# Patient Record
Sex: MALE | Race: WHITE | HISPANIC OR LATINO
[De-identification: names, ages, dates, MRNs, and addresses within clinical notes are randomized per-mention and may not be internally consistent; named-entity substitution may affect disease eponyms.]

---

## 2017-08-02 PROBLEM — Z00.00 ENCOUNTER FOR PREVENTIVE HEALTH EXAMINATION: Status: ACTIVE | Noted: 2017-08-02

## 2017-08-30 ENCOUNTER — APPOINTMENT (OUTPATIENT)
Dept: CARDIOLOGY | Facility: CLINIC | Age: 52
End: 2017-08-30
Payer: COMMERCIAL

## 2017-08-30 PROCEDURE — 99245 OFF/OP CONSLTJ NEW/EST HI 55: CPT

## 2017-08-30 PROCEDURE — 93000 ELECTROCARDIOGRAM COMPLETE: CPT

## 2017-09-18 ENCOUNTER — TRANSCRIPTION ENCOUNTER (OUTPATIENT)
Age: 52
End: 2017-09-18

## 2017-10-11 ENCOUNTER — APPOINTMENT (OUTPATIENT)
Dept: CARDIOLOGY | Facility: CLINIC | Age: 52
End: 2017-10-11
Payer: COMMERCIAL

## 2017-10-11 PROCEDURE — 93306 TTE W/DOPPLER COMPLETE: CPT

## 2017-10-11 PROCEDURE — 78452 HT MUSCLE IMAGE SPECT MULT: CPT

## 2017-10-11 PROCEDURE — 93015 CV STRESS TEST SUPVJ I&R: CPT

## 2017-10-11 PROCEDURE — 93979 VASCULAR STUDY: CPT

## 2017-10-11 PROCEDURE — 93880 EXTRACRANIAL BILAT STUDY: CPT

## 2017-10-11 PROCEDURE — A9502: CPT

## 2017-10-13 ENCOUNTER — RECORD ABSTRACTING (OUTPATIENT)
Age: 52
End: 2017-10-13

## 2017-10-13 DIAGNOSIS — Z86.39 PERSONAL HISTORY OF OTHER ENDOCRINE, NUTRITIONAL AND METABOLIC DISEASE: ICD-10-CM

## 2017-10-13 DIAGNOSIS — Z87.891 PERSONAL HISTORY OF NICOTINE DEPENDENCE: ICD-10-CM

## 2017-10-13 DIAGNOSIS — Z83.3 FAMILY HISTORY OF DIABETES MELLITUS: ICD-10-CM

## 2017-10-13 DIAGNOSIS — Z78.9 OTHER SPECIFIED HEALTH STATUS: ICD-10-CM

## 2017-10-13 DIAGNOSIS — Z82.49 FAMILY HISTORY OF ISCHEMIC HEART DISEASE AND OTHER DISEASES OF THE CIRCULATORY SYSTEM: ICD-10-CM

## 2017-12-01 ENCOUNTER — APPOINTMENT (OUTPATIENT)
Dept: CARDIOLOGY | Facility: CLINIC | Age: 52
End: 2017-12-01
Payer: COMMERCIAL

## 2017-12-01 VITALS
WEIGHT: 240 LBS | HEIGHT: 70 IN | HEART RATE: 79 BPM | DIASTOLIC BLOOD PRESSURE: 108 MMHG | OXYGEN SATURATION: 96 % | BODY MASS INDEX: 34.36 KG/M2 | SYSTOLIC BLOOD PRESSURE: 150 MMHG

## 2017-12-01 PROCEDURE — 99214 OFFICE O/P EST MOD 30 MIN: CPT

## 2017-12-04 ENCOUNTER — APPOINTMENT (OUTPATIENT)
Dept: CARDIOLOGY | Facility: CLINIC | Age: 52
End: 2017-12-04

## 2018-04-30 ENCOUNTER — MEDICATION RENEWAL (OUTPATIENT)
Age: 53
End: 2018-04-30

## 2018-09-06 ENCOUNTER — TRANSCRIPTION ENCOUNTER (OUTPATIENT)
Age: 53
End: 2018-09-06

## 2018-09-21 ENCOUNTER — APPOINTMENT (OUTPATIENT)
Dept: CARDIOLOGY | Facility: CLINIC | Age: 53
End: 2018-09-21
Payer: COMMERCIAL

## 2018-09-21 ENCOUNTER — NON-APPOINTMENT (OUTPATIENT)
Age: 53
End: 2018-09-21

## 2018-09-21 VITALS
DIASTOLIC BLOOD PRESSURE: 108 MMHG | BODY MASS INDEX: 34.36 KG/M2 | OXYGEN SATURATION: 95 % | SYSTOLIC BLOOD PRESSURE: 150 MMHG | HEART RATE: 83 BPM | WEIGHT: 240 LBS | HEIGHT: 70 IN

## 2018-09-21 PROCEDURE — 93000 ELECTROCARDIOGRAM COMPLETE: CPT

## 2018-09-21 PROCEDURE — 99214 OFFICE O/P EST MOD 30 MIN: CPT

## 2019-09-18 ENCOUNTER — APPOINTMENT (OUTPATIENT)
Dept: CARDIOLOGY | Facility: CLINIC | Age: 54
End: 2019-09-18
Payer: COMMERCIAL

## 2019-09-18 ENCOUNTER — NON-APPOINTMENT (OUTPATIENT)
Age: 54
End: 2019-09-18

## 2019-09-18 VITALS
WEIGHT: 240 LBS | HEIGHT: 70 IN | SYSTOLIC BLOOD PRESSURE: 130 MMHG | BODY MASS INDEX: 34.36 KG/M2 | HEART RATE: 85 BPM | DIASTOLIC BLOOD PRESSURE: 100 MMHG | OXYGEN SATURATION: 97 %

## 2019-09-18 PROCEDURE — 93000 ELECTROCARDIOGRAM COMPLETE: CPT

## 2019-09-18 PROCEDURE — 99214 OFFICE O/P EST MOD 30 MIN: CPT

## 2019-09-18 NOTE — PHYSICAL EXAM
[General Appearance - Well Developed] : well developed [Well Groomed] : well groomed [Normal Appearance] : normal appearance [No Deformities] : no deformities [General Appearance - Well Nourished] : well nourished [General Appearance - In No Acute Distress] : no acute distress [Normal Oral Mucosa] : normal oral mucosa [No Oral Pallor] : no oral pallor [No Oral Cyanosis] : no oral cyanosis [Normal Jugular Venous A Waves Present] : normal jugular venous A waves present [Normal Jugular Venous V Waves Present] : normal jugular venous V waves present [No Jugular Venous Robles A Waves] : no jugular venous robles A waves [Auscultation Breath Sounds / Voice Sounds] : lungs were clear to auscultation bilaterally [Exaggerated Use Of Accessory Muscles For Inspiration] : no accessory muscle use [Respiration, Rhythm And Depth] : normal respiratory rhythm and effort [Heart Rate And Rhythm] : heart rate and rhythm were normal [Heart Sounds] : normal S1 and S2 [Abdomen Soft] : soft [Murmurs] : no murmurs present [Abdomen Tenderness] : non-tender [Abnormal Walk] : normal gait [Abdomen Mass (___ Cm)] : no abdominal mass palpated [Nail Clubbing] : no clubbing of the fingernails [Gait - Sufficient For Exercise Testing] : the gait was sufficient for exercise testing [Cyanosis, Localized] : no localized cyanosis [Petechial Hemorrhages (___cm)] : no petechial hemorrhages [Oriented To Time, Place, And Person] : oriented to person, place, and time [] : no ischemic changes [Affect] : the affect was normal [Mood] : the mood was normal [No Anxiety] : not feeling anxious [FreeTextEntry1] : Pleasant muscular middle-aged male

## 2019-09-18 NOTE — DISCUSSION/SUMMARY
[FreeTextEntry1] : Poncho is a 53-year-old male  with medical history detailed above and active medical issues including:\par \par -History of atypical chest pain, borderline abnormal baseline EKG unchanged,  normal perfusion MPI stress test, normal LVEF Nov 2017. Patient will have noninvasive testing with exercise stress echo to assess for obstructive CAD, HR and BP response, exercise-induced arrhythmia, 2DEcho for LVEF, structural heart disease, carotid and abdominal ultrasound to assess for obstructive PAD.\par \par -Dyslipidemia. Patient wishes to establish a low-fat low-cholesterol diet,  repeat labs ordered. \par \par -  Labile hypertension and associated HA. Average home blood pressures 140/100.  Home cuff will be calibrated next office visit.  Patient has hypertension and associated headache.  Started on Cozaar 25 mg daily followup home BPs. .  Discussed low salt diet, reduced caffeine and continued moderate exercise\par \par -Multiple orthopedic surgeries. \par \par - [employer] and [employer]\par \par Patient will be seen in cardiology followup after noninvasive testing. \par \par Poncho will follow up with Morgan Piña for primary care. \par

## 2019-09-18 NOTE — REASON FOR VISIT
[Follow-Up - Clinic] : a clinic follow-up of [FreeTextEntry2] : headache, dyslipidemia, labile HTN [FreeTextEntry1] : Poncho is a 53-year-old male with history of borderline dyslipidemia, multiple orthopedic surgeries,  for [employer]. \par \par Average home blood pressures 140/100.  Home cuff will be calibrated next office visit.  Patient has hypertension and associated headache.  Started on Cozaar 25 mg daily followup home BPs. \par \par Cardiovascular review of symptoms is negative for exertional chest pain, dyspnea, palpitations, dizziness or syncope.  No PND or orthopnea leg edema.  No bleeding or black stool.\par \par Patient is exercising 30 minutes in the gym without exertional chest pain.  \par \par Labs June 2018, normal CBC, BMP, LFTs, TSH, HbA1c 5.0,  potassium 5.4, total cholesterol 243, triglyceride 129, HDL 49, \par \par Exercise Myoview stress test October 2017 LVEF 58%, normal perfusion, nonischemic EKG response, no anginal symptoms, baseline NSR early repolarization,  90% MPHR, 10 minutes Taras protocol.\par \par 2-D echo October 2017, LVEF 60%, normal diastolic function, trace MR TR, normal PASP the\par \par Carotid and abdominal ultrasound October 2017, normal aortic size, nonobstructive\par

## 2019-10-08 ENCOUNTER — APPOINTMENT (OUTPATIENT)
Dept: CARDIOLOGY | Facility: CLINIC | Age: 54
End: 2019-10-08
Payer: COMMERCIAL

## 2019-10-08 PROCEDURE — 93880 EXTRACRANIAL BILAT STUDY: CPT

## 2019-10-08 PROCEDURE — 93306 TTE W/DOPPLER COMPLETE: CPT

## 2019-10-08 PROCEDURE — 93979 VASCULAR STUDY: CPT

## 2019-10-10 ENCOUNTER — APPOINTMENT (OUTPATIENT)
Dept: CARDIOLOGY | Facility: CLINIC | Age: 54
End: 2019-10-10
Payer: COMMERCIAL

## 2019-10-10 PROCEDURE — 93351 STRESS TTE COMPLETE: CPT

## 2019-10-23 ENCOUNTER — TRANSCRIPTION ENCOUNTER (OUTPATIENT)
Age: 54
End: 2019-10-23

## 2019-10-23 ENCOUNTER — APPOINTMENT (OUTPATIENT)
Dept: CARDIOLOGY | Facility: CLINIC | Age: 54
End: 2019-10-23
Payer: COMMERCIAL

## 2019-10-23 VITALS
SYSTOLIC BLOOD PRESSURE: 150 MMHG | BODY MASS INDEX: 34.36 KG/M2 | WEIGHT: 240 LBS | HEIGHT: 70 IN | HEART RATE: 91 BPM | DIASTOLIC BLOOD PRESSURE: 100 MMHG | OXYGEN SATURATION: 95 %

## 2019-10-23 PROCEDURE — 99214 OFFICE O/P EST MOD 30 MIN: CPT

## 2019-10-24 ENCOUNTER — MEDICATION RENEWAL (OUTPATIENT)
Age: 54
End: 2019-10-24

## 2019-11-25 NOTE — REASON FOR VISIT
[Follow-Up - Clinic] : a clinic follow-up of [FreeTextEntry2] : noninvasive testing for headache, dyslipidemia, labile HTN [FreeTextEntry1] : Poncho is a 53-year-old male with history of HTN,  borderline dyslipidemia, multiple orthopedic surgeries,  for [employer]. \par \par Home wrist blood pressure cuff checked today is inaccurate, patient will purchase an arm automatic blood pressure cuff and recalibrate.  Patient will continue Cozaar 25 mg daily. \par \par Cardiovascular review of symptoms is negative for exertional chest pain, dyspnea, palpitations, dizziness or syncope.  No PND or orthopnea leg edema.  No bleeding or black stool.\par \par Patient is exercising 30 minutes in the gym without exertional chest pain.\par \par Exercise stress echo October 2019, LVEF 60%, normal exercise wall motion, equivocal EKG response, no chest pain, 104% MPHR, 11 minutes Taras protocol, baseline sinus rhythm LAD, hypertensive blood pressure response.\par \par Echocardiogram October 2019, LVEF 60%, mild concentric LVH, normal RVSP, ascending aorta 3.8cm. \par \par Carotid and abdominal ultrasound October 2019, mild nonobstructive plaque, normal abdominal aortic size limited study  \par \par Labs June 2018, normal CBC, BMP, LFTs, TSH, HbA1c 5.0,  potassium 5.4, total cholesterol 243, triglyceride 129, HDL 49, \par \par Exercise Myoview stress test October 2017 LVEF 58%, normal perfusion, nonischemic EKG response, no anginal symptoms, baseline NSR early repolarization,  90% MPHR, 10 minutes Taras protocol.\par \par 2-D echo October 2017, LVEF 60%, normal diastolic function, trace MR TR, normal PASP the\par \par Carotid and abdominal ultrasound October 2017, normal aortic size, nonobstructive\par

## 2019-11-25 NOTE — PHYSICAL EXAM
[General Appearance - Well Developed] : well developed [Normal Appearance] : normal appearance [Well Groomed] : well groomed [General Appearance - Well Nourished] : well nourished [No Deformities] : no deformities [General Appearance - In No Acute Distress] : no acute distress [Normal Oral Mucosa] : normal oral mucosa [No Oral Pallor] : no oral pallor [No Oral Cyanosis] : no oral cyanosis [Normal Jugular Venous V Waves Present] : normal jugular venous V waves present [Normal Jugular Venous A Waves Present] : normal jugular venous A waves present [No Jugular Venous Robles A Waves] : no jugular venous robles A waves [Respiration, Rhythm And Depth] : normal respiratory rhythm and effort [Exaggerated Use Of Accessory Muscles For Inspiration] : no accessory muscle use [Heart Rate And Rhythm] : heart rate and rhythm were normal [Auscultation Breath Sounds / Voice Sounds] : lungs were clear to auscultation bilaterally [Murmurs] : no murmurs present [Heart Sounds] : normal S1 and S2 [Abdomen Soft] : soft [Abdomen Tenderness] : non-tender [Abdomen Mass (___ Cm)] : no abdominal mass palpated [Abnormal Walk] : normal gait [Gait - Sufficient For Exercise Testing] : the gait was sufficient for exercise testing [Cyanosis, Localized] : no localized cyanosis [Nail Clubbing] : no clubbing of the fingernails [] : no ischemic changes [Petechial Hemorrhages (___cm)] : no petechial hemorrhages [Affect] : the affect was normal [Oriented To Time, Place, And Person] : oriented to person, place, and time [Mood] : the mood was normal [No Anxiety] : not feeling anxious [FreeTextEntry1] : Pleasant muscular middle-aged male

## 2019-11-25 NOTE — DISCUSSION/SUMMARY
[FreeTextEntry1] : Poncho is a 53-year-old male  with medical history detailed above and active medical issues including:\par \par -History of atypical chest pain, borderline abnormal baseline EKG unchanged,  normal perfusion MPI stress test, normal LVEF Nov 2017. Normal exercise stress echo normal LVEF of October 2019. \par \par -  Hypertension, improved blood pressure control on Cozaar 25 mg daily. Home wrist blood pressure cuff checked today is inaccurate, patient will purchase an arm automatic blood pressure cuff and recalibrate.  Patient will continue Cozaar 25 mg daily.   Discussed low salt diet, reduced caffeine and continued moderate exercise. \par \par - Dyslipidemia. Patient wishes to establish a low-fat low-cholesterol diet,  repeat labs ordered. \par \par -Multiple orthopedic surgeries. \par \par - [employer] and [employer]\par \par Patient will be seen in cardiology followup 6 months. Current cardiac medications remain unchanged. Repeat labs will be ordered with PMD.\par \par Poncho will follow up with Vitaly Garcia for primary care. \par \par Addendum 11/25/19:\par Home BP averaging 135/80 on Cozaar 25mg. Patient will continue current medication.\par Patient is preop for knee arthroscopy Dr Franco 1/29/20 [facility].\par Patient is optimized from a cardiovascular perspective and may proceed with surgery. Patient currently not on anticoagulation or aspirin.  Patient will continue blood pressure medication Cozaar up to the time of surgery.  Please call with any further questions. \par

## 2020-01-15 ENCOUNTER — APPOINTMENT (OUTPATIENT)
Dept: CARDIOLOGY | Facility: CLINIC | Age: 55
End: 2020-01-15
Payer: COMMERCIAL

## 2020-01-15 VITALS
HEART RATE: 114 BPM | OXYGEN SATURATION: 96 % | SYSTOLIC BLOOD PRESSURE: 164 MMHG | WEIGHT: 240 LBS | BODY MASS INDEX: 34.36 KG/M2 | DIASTOLIC BLOOD PRESSURE: 90 MMHG | HEIGHT: 70 IN

## 2020-01-15 DIAGNOSIS — R07.9 CHEST PAIN, UNSPECIFIED: ICD-10-CM

## 2020-01-15 PROCEDURE — 99214 OFFICE O/P EST MOD 30 MIN: CPT

## 2020-01-15 NOTE — DISCUSSION/SUMMARY
[FreeTextEntry1] : Poncho is a 54-year-old male  with medical history detailed above and active medical issues including:\par \par -History of atypical chest pain, resolved. Borderline abnormal baseline EKG unchanged. Normal perfusion MPI stress test 2017. Normal exercise stress echo normal LVEF of October 2019. Remains asx with excellent functional status. No further testing recommended at this time. \par \par -  Hypertension, uncontrolled. Advised to uptitrate losartan to 50mg.  Discussed low salt diet, reduced caffeine and continued moderate exercise. Weight reduction. I will see him again next week to ensure BP has improved for preop. Check BMP. \par \par - Dyslipidemia. Patient wishes to establish a low-fat low-cholesterol diet.\par \par - [place] and [employer]\par \par -Patient is preop for knee arthroscopy Dr Franco 1/29/20 [facility].\par As long as his BP < 160/100 in followup, patient is optimized from a cardiovascular perspective and may proceed with surgery. Patient currently not on anticoagulation or aspirin.  Patient will continue blood pressure medication Cozaar up to the time of surgery. \par \par Sincerely,\par \par FERN Lin\par Patients history, testing, and plan reviewed with supervising MD: Dr. Patrick Valentino

## 2020-01-15 NOTE — PHYSICAL EXAM
[Normal Appearance] : normal appearance [General Appearance - Well Developed] : well developed [Well Groomed] : well groomed [General Appearance - Well Nourished] : well nourished [No Deformities] : no deformities [General Appearance - In No Acute Distress] : no acute distress [Normal Conjunctiva] : the conjunctiva exhibited no abnormalities [No Oral Pallor] : no oral pallor [No Oral Cyanosis] : no oral cyanosis [Normal Jugular Venous A Waves Present] : normal jugular venous A waves present [Normal Jugular Venous V Waves Present] : normal jugular venous V waves present [No Jugular Venous Robles A Waves] : no jugular venous robles A waves [Respiration, Rhythm And Depth] : normal respiratory rhythm and effort [Exaggerated Use Of Accessory Muscles For Inspiration] : no accessory muscle use [Auscultation Breath Sounds / Voice Sounds] : lungs were clear to auscultation bilaterally [Heart Rate And Rhythm] : heart rate and rhythm were normal [Heart Sounds] : normal S1 and S2 [Murmurs] : no murmurs present [Abdomen Soft] : soft [Abdomen Tenderness] : non-tender [Abdomen Mass (___ Cm)] : no abdominal mass palpated [Abnormal Walk] : normal gait [Nail Clubbing] : no clubbing of the fingernails [Gait - Sufficient For Exercise Testing] : the gait was sufficient for exercise testing [Cyanosis, Localized] : no localized cyanosis [Petechial Hemorrhages (___cm)] : no petechial hemorrhages [] : no rash [No Venous Stasis] : no venous stasis [Skin Color & Pigmentation] : normal skin color and pigmentation [Skin Lesions] : no skin lesions [No Skin Ulcers] : no skin ulcer [No Xanthoma] : no  xanthoma was observed [Oriented To Time, Place, And Person] : oriented to person, place, and time [Affect] : the affect was normal [Mood] : the mood was normal [No Anxiety] : not feeling anxious

## 2020-01-15 NOTE — REASON FOR VISIT
[Follow-Up - Clinic] : a clinic follow-up of [Hypertension] : hypertension [Medication Management] : Medication management [FreeTextEntry1] : Poncho is a 54-year-old male with history of HTN,  borderline dyslipidemia, multiple orthopedic surgeries,  for [employer]. \par \par He presents today for BP evaluation. He was at [facility] for his upcoming LKA on 1/29/2020. He was already cleared by Dr. Valentino on 10/23/19 but today his BP was elevated at [facility]. Today on my exam 160/100. He takes losartan 25mg daily. Reports home BPs average 130/90. \par \par Excellent functional status. Cardiovascular review of symptoms is negative for exertional chest pain, dyspnea, palpitations, dizziness or syncope.  No PND or orthopnea leg edema.  \par \par EKG reviewed from [facility] reveals NSR. \par \par Exercise stress echo October 2019, LVEF 60%, normal exercise wall motion, equivocal EKG response, no chest pain, 104% MPHR, 11 minutes Taras protocol, baseline sinus rhythm LAD, hypertensive blood pressure response.\par \par Echocardiogram October 2019, LVEF 60%, mild concentric LVH, normal RVSP, ascending aorta 3.8cm. \par \par Carotid and abdominal ultrasound October 2019, mild nonobstructive plaque, normal abdominal aortic size limited study  \par

## 2020-01-24 ENCOUNTER — APPOINTMENT (OUTPATIENT)
Dept: CARDIOLOGY | Facility: CLINIC | Age: 55
End: 2020-01-24
Payer: COMMERCIAL

## 2020-01-24 VITALS
OXYGEN SATURATION: 97 % | DIASTOLIC BLOOD PRESSURE: 100 MMHG | BODY MASS INDEX: 34.36 KG/M2 | HEIGHT: 70 IN | HEART RATE: 72 BPM | WEIGHT: 240 LBS | SYSTOLIC BLOOD PRESSURE: 150 MMHG

## 2020-01-24 DIAGNOSIS — Z01.810 ENCOUNTER FOR PREPROCEDURAL CARDIOVASCULAR EXAMINATION: ICD-10-CM

## 2020-01-24 PROCEDURE — 99214 OFFICE O/P EST MOD 30 MIN: CPT

## 2020-01-24 NOTE — HISTORY OF PRESENT ILLNESS
[Preoperative Visit] : for a medical evaluation prior to surgery [Scheduled Procedure ___] : a [unfilled] [Date of Surgery ___] : on [unfilled] [FreeTextEntry1] : \mabel Isaac is a 54-year-old male with history of HTN, borderline dyslipidemia, multiple orthopedic surgeries,  for [employer]. \par \par He presents today for BP evaluation. He was at [facility] for his upcoming LKA on 1/29/2020. His BP was elevated at [facility]. Losartan uptitrated to 50mg daily. Remains high 150/100. Asymptomatic. \par \par Excellent functional status. Cardiovascular review of symptoms is negative for exertional chest pain, dyspnea, palpitations, dizziness or syncope. No PND or orthopnea leg edema. \par \par EKG reviewed from [facility] reveals NSR. \par \par Exercise stress echo October 2019, LVEF 60%, normal exercise wall motion, equivocal EKG response, no chest pain, 104% MPHR, 11 minutes Taras protocol, baseline sinus rhythm LAD, hypertensive blood pressure response.\par \par Echocardiogram October 2019, LVEF 60%, mild concentric LVH, normal RVSP, ascending aorta 3.8cm. \par \par Carotid and abdominal ultrasound October 2019, mild nonobstructive plaque, normal abdominal aortic size limited study

## 2020-01-24 NOTE — DISCUSSION/SUMMARY
[FreeTextEntry1] : Poncho is a 54-year-old male  with medical history detailed above and active medical issues including:\par \par -History of atypical chest pain, resolved. Borderline abnormal baseline EKG unchanged. Normal perfusion MPI stress test 2017. Normal exercise stress echo normal LVEF of October 2019. Remains asx with excellent functional status. No further testing recommended at this time. \par \par -  Hypertension, uncontrolled. Continue losartan 50mg daily. Check BMP again this week. Add norvasc 5mg daily.   Low salt diet, reduced caffeine and continued moderate exercise. Weight reduction. \par \par - Dyslipidemia. Patient wishes to establish a low-fat low-cholesterol diet.\par \par - [place] and [employer]\par \par -Patient is preop for knee arthroscopy Dr Franco 1/29/20\par As long as his BP is controlled the day of surgery, patient is optimized from a cardiovascular perspective and may proceed with surgery. With addition of norvasc today I anticipate his pressure will have improved by time of procedure. Patient currently not on anticoagulation or aspirin.  Patient will take his anti-hypertensives the day of procedure. \par \par Please do not hesitate to call for any questions or concerns. \par \par Sincerely,\par \par FERN Lin\par Patients history, testing, and plan reviewed with supervising MD: Dr. Antoine Farrell

## 2020-01-24 NOTE — PHYSICAL EXAM
[Normal Appearance] : normal appearance [General Appearance - Well Developed] : well developed [No Deformities] : no deformities [Well Groomed] : well groomed [General Appearance - Well Nourished] : well nourished [General Appearance - In No Acute Distress] : no acute distress [Normal Conjunctiva] : the conjunctiva exhibited no abnormalities [No Oral Cyanosis] : no oral cyanosis [No Oral Pallor] : no oral pallor [Normal Jugular Venous V Waves Present] : normal jugular venous V waves present [Normal Jugular Venous A Waves Present] : normal jugular venous A waves present [Respiration, Rhythm And Depth] : normal respiratory rhythm and effort [No Jugular Venous Robles A Waves] : no jugular venous robles A waves [Exaggerated Use Of Accessory Muscles For Inspiration] : no accessory muscle use [Auscultation Breath Sounds / Voice Sounds] : lungs were clear to auscultation bilaterally [Heart Sounds] : normal S1 and S2 [Heart Rate And Rhythm] : heart rate and rhythm were normal [Murmurs] : no murmurs present [Abdomen Soft] : soft [Abdomen Tenderness] : non-tender [Abdomen Mass (___ Cm)] : no abdominal mass palpated [Abnormal Walk] : normal gait [Nail Clubbing] : no clubbing of the fingernails [Gait - Sufficient For Exercise Testing] : the gait was sufficient for exercise testing [Petechial Hemorrhages (___cm)] : no petechial hemorrhages [Cyanosis, Localized] : no localized cyanosis [] : no ischemic changes [Skin Color & Pigmentation] : normal skin color and pigmentation [Skin Lesions] : no skin lesions [No Venous Stasis] : no venous stasis [No Skin Ulcers] : no skin ulcer [No Xanthoma] : no  xanthoma was observed [Oriented To Time, Place, And Person] : oriented to person, place, and time [No Anxiety] : not feeling anxious [Mood] : the mood was normal [Affect] : the affect was normal

## 2020-05-06 ENCOUNTER — APPOINTMENT (OUTPATIENT)
Dept: CARDIOLOGY | Facility: CLINIC | Age: 55
End: 2020-05-06

## 2020-07-17 ENCOUNTER — APPOINTMENT (OUTPATIENT)
Dept: CARDIOLOGY | Facility: CLINIC | Age: 55
End: 2020-07-17
Payer: COMMERCIAL

## 2020-07-17 VITALS
OXYGEN SATURATION: 97 % | DIASTOLIC BLOOD PRESSURE: 102 MMHG | BODY MASS INDEX: 34.36 KG/M2 | HEIGHT: 70 IN | SYSTOLIC BLOOD PRESSURE: 150 MMHG | WEIGHT: 240 LBS | HEART RATE: 79 BPM

## 2020-07-17 PROCEDURE — 99214 OFFICE O/P EST MOD 30 MIN: CPT

## 2020-07-17 RX ORDER — AMLODIPINE BESYLATE 5 MG/1
5 TABLET ORAL DAILY
Qty: 90 | Refills: 0 | Status: DISCONTINUED | COMMUNITY
Start: 2020-01-24 | End: 2020-07-17

## 2020-07-17 NOTE — REASON FOR VISIT
[Follow-Up - Clinic] : a clinic follow-up of [FreeTextEntry2] :  labile HTN, borderline dyslipidemia [FreeTextEntry1] : Poncho is a 54-year-old male with history of HTN,  borderline dyslipidemia, multiple orthopedic surgeries,  for [employer]. \par \par No recent home BP checks, patient will check home BPs daily for one week to confirm average BPs at goal. \par \par Cardiovascular review of symptoms is negative for exertional chest pain, dyspnea, palpitations, dizziness or syncope.  No PND or orthopnea leg edema.  No bleeding or black stool.\par \par Patient is exercising 30 minutes walking without exertional chest pain.\par \par Exercise stress echo October 2019, LVEF 60%, normal exercise wall motion, equivocal EKG response, no chest pain, 104% MPHR, 11 minutes Taras protocol, baseline sinus rhythm LAD, hypertensive blood pressure response.\par \par Echocardiogram October 2019, LVEF 60%, mild concentric LVH, normal RVSP, ascending aorta 3.8cm. \par \par Carotid and abdominal ultrasound October 2019, mild nonobstructive plaque, normal abdominal aortic size limited study  \par \par Labs June 2018, normal CBC, BMP, LFTs, TSH, HbA1c 5.0,  potassium 5.4, total cholesterol 243, triglyceride 129, HDL 49, \par \par Exercise Myoview stress test October 2017 LVEF 58%, normal perfusion, nonischemic EKG response, no anginal symptoms, baseline NSR early repolarization,  90% MPHR, 10 minutes Taras protocol.\par \par 2-D echo October 2017, LVEF 60%, normal diastolic function, trace MR TR, normal PASP the\par \par Carotid and abdominal ultrasound October 2017, normal aortic size, nonobstructive\par

## 2020-07-17 NOTE — DISCUSSION/SUMMARY
[FreeTextEntry1] : Poncho is a 54-year-old male  with medical history detailed above and active medical issues including:\par \par - History of atypical chest pain, borderline abnormal baseline EKG unchanged,  normal perfusion MPI stress test, normal LVEF Nov 2017. Normal exercise stress echo normal LVEF of October 2019. \par \par -  Hypertension, office BP elevated today. No recent home BP checks, patient will check home BPs daily for one week to confirm average BPs at goal. Discussed low sodium diet reduce caffeine and alcohol intake. \par \par - Dyslipidemia. Patient wishes to establish a low-fat low-cholesterol diet,  repeat labs ordered. \par \par - Multiple orthopedic surgeries. \par \par -  [employer] and [employer]\par \par Patient will be seen in cardiology followup 6 months. Patient will have 2-D echocardiogram to assess for  LV systolic function, wall motion and  structural heart disease. Carotid and abdominal ultrasound to assess for obstructive PAD. \par \par Current cardiac medications remain unchanged. Repeat labs will be ordered with PMD.\par \par Poncho will follow up with Vitaly Garcia for primary care. \par

## 2020-07-17 NOTE — PHYSICAL EXAM
[General Appearance - Well Developed] : well developed [Normal Appearance] : normal appearance [Well Groomed] : well groomed [No Deformities] : no deformities [General Appearance - In No Acute Distress] : no acute distress [General Appearance - Well Nourished] : well nourished [No Oral Pallor] : no oral pallor [Normal Oral Mucosa] : normal oral mucosa [No Oral Cyanosis] : no oral cyanosis [Normal Jugular Venous A Waves Present] : normal jugular venous A waves present [No Jugular Venous Robles A Waves] : no jugular venous robles A waves [Normal Jugular Venous V Waves Present] : normal jugular venous V waves present [Auscultation Breath Sounds / Voice Sounds] : lungs were clear to auscultation bilaterally [Exaggerated Use Of Accessory Muscles For Inspiration] : no accessory muscle use [Respiration, Rhythm And Depth] : normal respiratory rhythm and effort [Murmurs] : no murmurs present [Heart Rate And Rhythm] : heart rate and rhythm were normal [Heart Sounds] : normal S1 and S2 [Abdomen Soft] : soft [Abdomen Tenderness] : non-tender [Abdomen Mass (___ Cm)] : no abdominal mass palpated [Gait - Sufficient For Exercise Testing] : the gait was sufficient for exercise testing [Abnormal Walk] : normal gait [Nail Clubbing] : no clubbing of the fingernails [Cyanosis, Localized] : no localized cyanosis [Petechial Hemorrhages (___cm)] : no petechial hemorrhages [] : no ischemic changes [Oriented To Time, Place, And Person] : oriented to person, place, and time [Affect] : the affect was normal [Mood] : the mood was normal [No Anxiety] : not feeling anxious [FreeTextEntry1] : Pleasant muscular middle-aged male

## 2020-10-09 ENCOUNTER — TRANSCRIPTION ENCOUNTER (OUTPATIENT)
Age: 55
End: 2020-10-09

## 2020-11-03 ENCOUNTER — APPOINTMENT (OUTPATIENT)
Dept: CARDIOLOGY | Facility: CLINIC | Age: 55
End: 2020-11-03
Payer: COMMERCIAL

## 2020-11-03 VITALS
TEMPERATURE: 97.4 F | BODY MASS INDEX: 34.36 KG/M2 | DIASTOLIC BLOOD PRESSURE: 104 MMHG | HEIGHT: 70 IN | WEIGHT: 240 LBS | SYSTOLIC BLOOD PRESSURE: 140 MMHG | OXYGEN SATURATION: 97 % | HEART RATE: 72 BPM

## 2020-11-03 PROCEDURE — 99214 OFFICE O/P EST MOD 30 MIN: CPT

## 2020-11-03 PROCEDURE — 93306 TTE W/DOPPLER COMPLETE: CPT

## 2020-11-03 PROCEDURE — 99072 ADDL SUPL MATRL&STAF TM PHE: CPT

## 2020-11-03 RX ORDER — AZELASTINE HYDROCHLORIDE 205.5 UG/1
0.15 SPRAY, METERED NASAL
Qty: 1 | Refills: 3 | Status: DISCONTINUED | COMMUNITY
Start: 2019-09-18 | End: 2020-11-03

## 2020-11-03 NOTE — PHYSICAL EXAM
[General Appearance - Well Developed] : well developed [Normal Appearance] : normal appearance [Well Groomed] : well groomed [General Appearance - Well Nourished] : well nourished [No Deformities] : no deformities [General Appearance - In No Acute Distress] : no acute distress [Normal Oral Mucosa] : normal oral mucosa [No Oral Pallor] : no oral pallor [No Oral Cyanosis] : no oral cyanosis [Normal Jugular Venous A Waves Present] : normal jugular venous A waves present [Normal Jugular Venous V Waves Present] : normal jugular venous V waves present [No Jugular Venous Robles A Waves] : no jugular venous robles A waves [Respiration, Rhythm And Depth] : normal respiratory rhythm and effort [Exaggerated Use Of Accessory Muscles For Inspiration] : no accessory muscle use [Auscultation Breath Sounds / Voice Sounds] : lungs were clear to auscultation bilaterally [Heart Rate And Rhythm] : heart rate and rhythm were normal [Heart Sounds] : normal S1 and S2 [Murmurs] : no murmurs present [Abdomen Soft] : soft [Abdomen Tenderness] : non-tender [Abdomen Mass (___ Cm)] : no abdominal mass palpated [Abnormal Walk] : normal gait [Gait - Sufficient For Exercise Testing] : the gait was sufficient for exercise testing [Nail Clubbing] : no clubbing of the fingernails [Cyanosis, Localized] : no localized cyanosis [Petechial Hemorrhages (___cm)] : no petechial hemorrhages [] : no ischemic changes [Oriented To Time, Place, And Person] : oriented to person, place, and time [Affect] : the affect was normal [Mood] : the mood was normal [No Anxiety] : not feeling anxious [FreeTextEntry1] : Pleasant muscular middle-aged male

## 2020-11-03 NOTE — REASON FOR VISIT
[Follow-Up - Clinic] : a clinic follow-up of [FreeTextEntry2] :  labile HTN, borderline dyslipidemia

## 2020-11-03 NOTE — DISCUSSION/SUMMARY
[FreeTextEntry1] : Poncho is a 54-year-old male  with medical history detailed above and active medical issues including:\par \par - History of atypical chest pain, borderline abnormal baseline EKG unchanged,  normal perfusion MPI stress test, normal LVEF Nov 2017. Normal exercise stress echo normal LVEF of October 2019. \par \par -  Hypertension, office BP elevated today. No recent home BP checks, patient will check home BPs daily for one week to confirm average BPs at goal. Discussed low sodium diet reduce caffeine and alcohol intake. \par \par - Dyslipidemia. Patient wishes to establish a low-fat low-cholesterol diet,  repeat labs ordered. \par \par - Multiple orthopedic surgeries. \par \par -  [employer] and [employer]\par \par Patient will be seen in cardiology followup 6 months. Patient will have 2-D echocardiogram to assess for  LV systolic function, wall motion and  structural heart disease.\par \par Current cardiac medications remain unchanged. Repeat labs ordered .\par \par Poncho will follow up with Vitaly Garcia for primary care. \par

## 2020-11-03 NOTE — HISTORY OF PRESENT ILLNESS
[FreeTextEntry1] : Poncho is a 54-year-old male with history of HTN,  borderline dyslipidemia, multiple orthopedic surgeries,  for [employer]. \par \par Patient states average home blood pressures 130/80s.  Patient will monitor home blood pressure daily over the next week and have home blood pressure cuff calibrated. . \par \par Cardiovascular review of symptoms is negative for exertional chest pain, dyspnea, palpitations, dizziness or syncope.  No PND or orthopnea leg edema.  No bleeding or black stool.\par \par Patient is exercising 30 minutes walking without exertional chest pain. Patient had a 20 pound weight gain and subsequent 20 pound weight loss over the past 6 months. \par \par Labs Oct 2020 normal CBC, BMP, LFT, fasting cholesterol 208, triglyceride 84, HDL 44, \par \par Exercise stress echo October 2019, LVEF 60%, normal exercise wall motion, equivocal EKG response, no chest pain, 104% MPHR, 11 minutes Taras protocol, baseline sinus rhythm LAD, hypertensive blood pressure response.\par \par Echocardiogram October 2019, LVEF 60%, mild concentric LVH, normal RVSP, ascending aorta 3.8cm. \par \par Carotid and abdominal ultrasound October 2019, mild nonobstructive plaque, normal abdominal aortic size limited study  \par \par Labs June 2018, normal CBC, BMP, LFTs, TSH, HbA1c 5.0,  potassium 5.4, total cholesterol 243, triglyceride 129, HDL 49, \par \par Exercise Myoview stress test October 2017 LVEF 58%, normal perfusion, nonischemic EKG response, no anginal symptoms, baseline NSR early repolarization,  90% MPHR, 10 minutes Taras protocol.\par \par 2-D echo October 2017, LVEF 60%, normal diastolic function, trace MR TR, normal PASP the\par \par Carotid and abdominal ultrasound October 2017, normal aortic size, nonobstructive\par

## 2020-11-23 ENCOUNTER — TRANSCRIPTION ENCOUNTER (OUTPATIENT)
Age: 55
End: 2020-11-23

## 2020-11-23 ENCOUNTER — NON-APPOINTMENT (OUTPATIENT)
Age: 55
End: 2020-11-23

## 2020-12-23 PROBLEM — Z01.810 ENCOUNTER FOR PREOPERATIVE VASCULAR EXAMINATION: Status: RESOLVED | Noted: 2020-01-15 | Resolved: 2020-12-23

## 2021-02-02 ENCOUNTER — TRANSCRIPTION ENCOUNTER (OUTPATIENT)
Age: 56
End: 2021-02-02

## 2021-02-20 ENCOUNTER — TRANSCRIPTION ENCOUNTER (OUTPATIENT)
Age: 56
End: 2021-02-20

## 2021-03-26 ENCOUNTER — TRANSCRIPTION ENCOUNTER (OUTPATIENT)
Age: 56
End: 2021-03-26

## 2021-05-19 ENCOUNTER — APPOINTMENT (OUTPATIENT)
Dept: CARDIOLOGY | Facility: CLINIC | Age: 56
End: 2021-05-19
Payer: COMMERCIAL

## 2021-05-19 VITALS
HEIGHT: 70 IN | TEMPERATURE: 97.7 F | DIASTOLIC BLOOD PRESSURE: 110 MMHG | BODY MASS INDEX: 36.51 KG/M2 | SYSTOLIC BLOOD PRESSURE: 150 MMHG | WEIGHT: 255 LBS | HEART RATE: 89 BPM | OXYGEN SATURATION: 98 %

## 2021-05-19 PROCEDURE — 99072 ADDL SUPL MATRL&STAF TM PHE: CPT

## 2021-05-19 PROCEDURE — 99214 OFFICE O/P EST MOD 30 MIN: CPT

## 2021-05-19 NOTE — PHYSICAL EXAM
[General Appearance - Well Developed] : well developed [Normal Appearance] : normal appearance [Well Groomed] : well groomed [General Appearance - Well Nourished] : well nourished [No Deformities] : no deformities [General Appearance - In No Acute Distress] : no acute distress [Normal Oral Mucosa] : normal oral mucosa [No Oral Pallor] : no oral pallor [No Oral Cyanosis] : no oral cyanosis [Normal Jugular Venous A Waves Present] : normal jugular venous A waves present [Normal Jugular Venous V Waves Present] : normal jugular venous V waves present [No Jugular Venous Robles A Waves] : no jugular venous robles A waves [Respiration, Rhythm And Depth] : normal respiratory rhythm and effort [Exaggerated Use Of Accessory Muscles For Inspiration] : no accessory muscle use [Auscultation Breath Sounds / Voice Sounds] : lungs were clear to auscultation bilaterally [Heart Rate And Rhythm] : heart rate and rhythm were normal [Heart Sounds] : normal S1 and S2 [Murmurs] : no murmurs present [Abdomen Soft] : soft [Abdomen Tenderness] : non-tender [Abdomen Mass (___ Cm)] : no abdominal mass palpated [Abnormal Walk] : normal gait [Gait - Sufficient For Exercise Testing] : the gait was sufficient for exercise testing [Nail Clubbing] : no clubbing of the fingernails [Cyanosis, Localized] : no localized cyanosis [Petechial Hemorrhages (___cm)] : no petechial hemorrhages [] : no ischemic changes [Affect] : the affect was normal [Oriented To Time, Place, And Person] : oriented to person, place, and time [Mood] : the mood was normal [No Anxiety] : not feeling anxious [FreeTextEntry1] : Pleasant muscular middle-aged male

## 2021-05-19 NOTE — HISTORY OF PRESENT ILLNESS
[FreeTextEntry1] : Poncho is a 55-year-old male [place]  with history of labile HTN,  borderline dyslipidemia, multiple orthopedic surgeries.\par \par Patient states average home blood pressures at guideline goal.  Patient will monitor home blood pressure daily over the next week.\par \par Cardiovascular review of symptoms is negative for exertional chest pain, dyspnea, palpitations, dizziness or syncope.  No PND or orthopnea leg edema.  No bleeding or black stool.\par \par Patient is exercising 30 minutes walking without exertional chest pain. Patient had a 15 pound weight gain over the past 6 months. \par \par Labs Oct 2020 normal CBC, BMP, LFT, fasting cholesterol 208, triglyceride 84, HDL 44, \par \par Exercise stress echo October 2019, LVEF 60%, normal exercise wall motion, equivocal EKG response, no chest pain, 104% MPHR, 11 minutes Taras protocol, baseline sinus rhythm LAD, hypertensive blood pressure response.\par \par Echocardiogram October 2019, LVEF 60%, mild concentric LVH, normal RVSP, ascending aorta 3.8cm. \par \par Carotid and abdominal ultrasound October 2019, mild nonobstructive plaque, normal abdominal aortic size limited study  \par \par Labs June 2018, normal CBC, BMP, LFTs, TSH, HbA1c 5.0,  potassium 5.4, total cholesterol 243, triglyceride 129, HDL 49, \par \par Exercise Myoview stress test October 2017 LVEF 58%, normal perfusion, nonischemic EKG response, no anginal symptoms, baseline NSR early repolarization,  90% MPHR, 10 minutes Taras protocol.\par \par 2-D echo October 2017, LVEF 60%, normal diastolic function, trace MR TR, normal PASP the\par \par Carotid and abdominal ultrasound October 2017, normal aortic size, nonobstructive\par

## 2021-05-19 NOTE — DISCUSSION/SUMMARY
[FreeTextEntry1] : Poncho is a 55-year-old male  with medical history detailed above and active medical issues including:\par \par - No further chest pain.  History of atypical chest pain, borderline abnormal baseline EKG unchanged,  normal perfusion MPI stress test, normal LVEF Nov 2017. Normal exercise stress echo normal LVEF of October 2019. \par \par -  Hypertension, office BP elevated today. No recent home BP checks, patient will check home BPs daily for one week to confirm average BPs at goal. Discussed low sodium diet reduce caffeine and alcohol intake. \par \par - Dyslipidemia. Patient wishes to establish a low-fat low-cholesterol diet,  repeat labs ordered. \par \par - Multiple orthopedic surgeries. \par \par -  [employer] and [employer]\par \par Patient will be seen in cardiology followup 6 months same day 2-D echocardiogram to assess for  LV systolic function, wall motion and  structural heart disease.\par \par Current cardiac medications remain unchanged. Repeat labs ordered .\par \par Poncho will follow up with Vitaly Garcia for primary care. \par

## 2021-07-09 ENCOUNTER — TRANSCRIPTION ENCOUNTER (OUTPATIENT)
Age: 56
End: 2021-07-09

## 2021-07-26 ENCOUNTER — TRANSCRIPTION ENCOUNTER (OUTPATIENT)
Age: 56
End: 2021-07-26

## 2021-09-27 RX ORDER — ROSUVASTATIN CALCIUM 10 MG/1
10 TABLET, FILM COATED ORAL
Qty: 90 | Refills: 1 | Status: DISCONTINUED | COMMUNITY
Start: 2021-06-24 | End: 2021-09-27

## 2021-09-27 RX ORDER — ASPIRIN ENTERIC COATED TABLETS 81 MG 81 MG/1
81 TABLET, DELAYED RELEASE ORAL DAILY
Qty: 90 | Refills: 3 | Status: ACTIVE | COMMUNITY
Start: 2021-09-27 | End: 1900-01-01

## 2021-10-10 ENCOUNTER — APPOINTMENT (OUTPATIENT)
Dept: DISASTER EMERGENCY | Facility: CLINIC | Age: 56
End: 2021-10-10

## 2021-10-10 DIAGNOSIS — Z01.818 ENCOUNTER FOR OTHER PREPROCEDURAL EXAMINATION: ICD-10-CM

## 2021-10-11 LAB — SARS-COV-2 N GENE NPH QL NAA+PROBE: NOT DETECTED

## 2021-10-13 ENCOUNTER — OUTPATIENT (OUTPATIENT)
Dept: OUTPATIENT SERVICES | Facility: HOSPITAL | Age: 56
LOS: 1 days | End: 2021-10-13
Payer: COMMERCIAL

## 2021-10-13 PROCEDURE — 93458 L HRT ARTERY/VENTRICLE ANGIO: CPT | Mod: 26

## 2021-10-13 PROCEDURE — 93010 ELECTROCARDIOGRAM REPORT: CPT

## 2021-10-13 PROCEDURE — 99244 OFF/OP CNSLTJ NEW/EST MOD 40: CPT | Mod: 25

## 2021-10-18 ENCOUNTER — APPOINTMENT (OUTPATIENT)
Dept: CARDIOLOGY | Facility: CLINIC | Age: 56
End: 2021-10-18
Payer: COMMERCIAL

## 2021-10-18 VITALS
OXYGEN SATURATION: 98 % | WEIGHT: 250 LBS | DIASTOLIC BLOOD PRESSURE: 88 MMHG | HEART RATE: 91 BPM | SYSTOLIC BLOOD PRESSURE: 138 MMHG | HEIGHT: 70 IN | BODY MASS INDEX: 35.79 KG/M2 | TEMPERATURE: 97.3 F

## 2021-10-18 PROCEDURE — 99214 OFFICE O/P EST MOD 30 MIN: CPT

## 2021-10-18 RX ORDER — CLOPIDOGREL BISULFATE 75 MG/1
75 TABLET, FILM COATED ORAL DAILY
Qty: 1 | Refills: 1 | Status: DISCONTINUED | COMMUNITY
Start: 2021-09-27 | End: 2021-10-18

## 2021-10-31 ENCOUNTER — TRANSCRIPTION ENCOUNTER (OUTPATIENT)
Age: 56
End: 2021-10-31

## 2021-11-09 ENCOUNTER — APPOINTMENT (OUTPATIENT)
Dept: CARDIOLOGY | Facility: CLINIC | Age: 56
End: 2021-11-09
Payer: COMMERCIAL

## 2021-11-09 VITALS
SYSTOLIC BLOOD PRESSURE: 140 MMHG | BODY MASS INDEX: 35.79 KG/M2 | DIASTOLIC BLOOD PRESSURE: 90 MMHG | TEMPERATURE: 97.6 F | WEIGHT: 250 LBS | OXYGEN SATURATION: 96 % | HEIGHT: 70 IN | HEART RATE: 76 BPM

## 2021-11-09 PROCEDURE — 99214 OFFICE O/P EST MOD 30 MIN: CPT

## 2021-11-09 PROCEDURE — 93306 TTE W/DOPPLER COMPLETE: CPT

## 2021-11-09 NOTE — DISCUSSION/SUMMARY
[FreeTextEntry1] : Poncho is a 56-year-old male  with medical history detailed above and active medical issues including:\par \par - No further chest pain.  History of atypical chest pain, borderline abnormal baseline EKG unchanged,  normal perfusion MPI stress test, normal LVEF Nov 2017. Normal exercise stress echo normal LVEF of October 2019, high risk coronary calcium score 481 Sept 2021, nonobstructive catheterization Oct 2021, continue aggressive risk factor modification.\par \par -  Hypertension,  patient will check home BPs daily for one week to confirm average BPs at goal. Discussed low sodium diet reduce caffeine and alcohol intake. \par \par - Dyslipidemia on high-dose atorvastatin well-tolerated\par \par - Multiple orthopedic surgeries. \par \par -  [employer] and [employer]\par \par Patient will be seen in cardiology followup 6 months.\par \par Current cardiac medications remain unchanged and renewals  are up to date. Repeat labs will be ordered with PMD.\par \par Poncoh will follow up with Vitaly Garcia for primary care. \par

## 2021-11-09 NOTE — HISTORY OF PRESENT ILLNESS
[FreeTextEntry1] : Poncho is a 55-year-old male [place]  with history of labile HTN,  borderline dyslipidemia, multiple orthopedic surgeries, high risk coronary calcium score 481 Sept 2021, nonobstructive catheterization Oct 2021. \par \par Patient states average home blood pressures at guideline goal.  Patient will monitor home blood pressure daily over the next week.\par \par Cardiovascular review of symptoms is negative for exertional chest pain, dyspnea, palpitations, dizziness or syncope.  No PND or orthopnea leg edema.  No bleeding or black stool.\par \par Patient is exercising 30 minutes walking without exertional chest pain. Patient had a 15 pound weight gain over the past 6 months. \par \par Cardiac catheterization Oct 2021 nonobstructive coronaries, mLAD 30%, mRCA 40% medical therapy\par \par Coronary calcium score Sept 2021, total calcium 481, , RI 99, LCx 42, \par \par Labs Oct 2020 normal CBC, BMP, LFT, fasting cholesterol 208, triglyceride 84, HDL 44, \par \par Exercise stress echo October 2019, LVEF 60%, normal exercise wall motion, equivocal EKG response, no chest pain, 104% MPHR, 11 minutes Taras protocol, baseline sinus rhythm LAD, hypertensive blood pressure response.\par \par Echocardiogram October 2019, LVEF 60%, mild concentric LVH, normal RVSP, ascending aorta 3.8cm. \par \par Carotid and abdominal ultrasound October 2019, mild nonobstructive plaque, normal abdominal aortic size limited study  \par \par Labs June 2018, normal CBC, BMP, LFTs, TSH, HbA1c 5.0,  potassium 5.4, total cholesterol 243, triglyceride 129, HDL 49, \par \par Exercise Myoview stress test October 2017 LVEF 58%, normal perfusion, nonischemic EKG response, no anginal symptoms, baseline NSR early repolarization,  90% MPHR, 10 minutes Taras protocol.\par \par 2-D echo October 2017, LVEF 60%, normal diastolic function, trace MR TR, normal PASP the\par \par Carotid and abdominal ultrasound October 2017, normal aortic size, nonobstructive\par

## 2021-11-09 NOTE — PHYSICAL EXAM
[Well Developed] : well developed [Well Nourished] : well nourished [No Acute Distress] : no acute distress [Normal Conjunctiva] : normal conjunctiva [Normal Venous Pressure] : normal venous pressure [No Carotid Bruit] : no carotid bruit [Normal S1, S2] : normal S1, S2 [No Murmur] : no murmur [No Rub] : no rub [No Gallop] : no gallop [Clear Lung Fields] : clear lung fields [Good Air Entry] : good air entry [No Respiratory Distress] : no respiratory distress  [Soft] : abdomen soft [Non Tender] : non-tender [No Masses/organomegaly] : no masses/organomegaly [Normal Bowel Sounds] : normal bowel sounds [Normal Gait] : normal gait [No Edema] : no edema [No Cyanosis] : no cyanosis [No Clubbing] : no clubbing [No Varicosities] : no varicosities [No Rash] : no rash [No Skin Lesions] : no skin lesions [Moves all extremities] : moves all extremities [No Focal Deficits] : no focal deficits [Normal Speech] : normal speech [Alert and Oriented] : alert and oriented [Normal memory] : normal memory [de-identified] : Femoral access site is c/d/i without hematoma.  2+ distal pulses [General Appearance - Well Developed] : well developed [Normal Appearance] : normal appearance [Well Groomed] : well groomed [General Appearance - Well Nourished] : well nourished [No Deformities] : no deformities [General Appearance - In No Acute Distress] : no acute distress [FreeTextEntry1] : Pleasant muscular middle-aged male [Normal Oral Mucosa] : normal oral mucosa [No Oral Pallor] : no oral pallor [No Oral Cyanosis] : no oral cyanosis [Normal Jugular Venous A Waves Present] : normal jugular venous A waves present [Normal Jugular Venous V Waves Present] : normal jugular venous V waves present [No Jugular Venous Robles A Waves] : no jugular venous robles A waves [Respiration, Rhythm And Depth] : normal respiratory rhythm and effort [Exaggerated Use Of Accessory Muscles For Inspiration] : no accessory muscle use [Auscultation Breath Sounds / Voice Sounds] : lungs were clear to auscultation bilaterally [Heart Rate And Rhythm] : heart rate and rhythm were normal [Heart Sounds] : normal S1 and S2 [Murmurs] : no murmurs present [Abdomen Soft] : soft [Abdomen Tenderness] : non-tender [Abdomen Mass (___ Cm)] : no abdominal mass palpated [Abnormal Walk] : normal gait [Gait - Sufficient For Exercise Testing] : the gait was sufficient for exercise testing [Nail Clubbing] : no clubbing of the fingernails [Cyanosis, Localized] : no localized cyanosis [Petechial Hemorrhages (___cm)] : no petechial hemorrhages [] : no ischemic changes [Oriented To Time, Place, And Person] : oriented to person, place, and time [Affect] : the affect was normal [Mood] : the mood was normal [No Anxiety] : not feeling anxious

## 2021-11-19 ENCOUNTER — TRANSCRIPTION ENCOUNTER (OUTPATIENT)
Age: 56
End: 2021-11-19

## 2022-01-03 ENCOUNTER — TRANSCRIPTION ENCOUNTER (OUTPATIENT)
Age: 57
End: 2022-01-03

## 2022-06-01 ENCOUNTER — APPOINTMENT (OUTPATIENT)
Dept: CARDIOLOGY | Facility: CLINIC | Age: 57
End: 2022-06-01
Payer: COMMERCIAL

## 2022-06-01 VITALS
TEMPERATURE: 97.3 F | BODY MASS INDEX: 35.79 KG/M2 | WEIGHT: 250 LBS | SYSTOLIC BLOOD PRESSURE: 158 MMHG | HEIGHT: 70 IN | HEART RATE: 79 BPM | DIASTOLIC BLOOD PRESSURE: 100 MMHG | OXYGEN SATURATION: 95 %

## 2022-06-01 PROCEDURE — 99214 OFFICE O/P EST MOD 30 MIN: CPT

## 2022-08-10 ENCOUNTER — NON-APPOINTMENT (OUTPATIENT)
Age: 57
End: 2022-08-10

## 2022-09-29 NOTE — HISTORY OF PRESENT ILLNESS
[FreeTextEntry1] : Poncho is a 56-year-old male [place]  with history of labile HTN,  dyslipidemia, multiple orthopedic surgeries, high risk coronary calcium score 481 Sept 2021, nonobstructive catheterization Oct 2021. \par \par Patient states average home blood pressures at guideline goal.  Patient will monitor home blood pressure daily over the next week.\par \par Cardiovascular review of symptoms is negative for exertional chest pain, dyspnea, palpitations, dizziness or syncope.  No PND or orthopnea leg edema.  No bleeding or black stool.\par \par Patient is exercising 30 minutes walking without exertional chest pain. Patient had a 15 pound weight gain over the past 6 months. \par \par Cardiac catheterization Oct 2021 nonobstructive coronaries, mLAD 30%, mRCA 40% medical therapy\par \par Coronary calcium score Sept 2021, total calcium 481, , RI 99, LCx 42, \par \par Labs Oct 2020 normal CBC, BMP, LFT, fasting cholesterol 208, triglyceride 84, HDL 44, \par \par Exercise stress echo October 2019, LVEF 60%, normal exercise wall motion, equivocal EKG response, no chest pain, 104% MPHR, 11 minutes Taras protocol, baseline sinus rhythm LAD, hypertensive blood pressure response.\par \par Echocardiogram October 2019, LVEF 60%, mild concentric LVH, normal RVSP, ascending aorta 3.8cm. \par \par Carotid and abdominal ultrasound October 2019, mild nonobstructive plaque, normal abdominal aortic size limited study  \par \par Labs June 2018, normal CBC, BMP, LFTs, TSH, HbA1c 5.0,  potassium 5.4, total cholesterol 243, triglyceride 129, HDL 49, \par \par Exercise Myoview stress test October 2017 LVEF 58%, normal perfusion, nonischemic EKG response, no anginal symptoms, baseline NSR early repolarization,  90% MPHR, 10 minutes Taras protocol.\par \par 2-D echo October 2017, LVEF 60%, normal diastolic function, trace MR TR, normal PASP the\par \par Carotid and abdominal ultrasound October 2017, normal aortic size, nonobstructive\par

## 2022-09-29 NOTE — DISCUSSION/SUMMARY
[FreeTextEntry1] : Poncho is a 56-year-old male  with medical history detailed above and active medical issues including:\par \par - No further chest pain.  History of atypical chest pain, borderline abnormal baseline EKG unchanged,  normal perfusion MPI stress test, normal LVEF Nov 2017. Normal exercise stress echo normal LVEF of October 2019, high risk coronary calcium score 481 Sept 2021, nonobstructive catheterization Oct 2021, continue aggressive risk factor modification.\par \par -  Hypertension,  patient will check home BPs daily for one week to confirm average BPs at goal. Discussed low sodium diet reduce caffeine and alcohol intake. \par \par - Dyslipidemia on high-dose atorvastatin well-tolerated\par \par - Multiple orthopedic surgeries. \par \par -  [employer] and [employer], \par \par Patient will be seen in cardiology followup 6 months same-day echocardiogram.\par \par Current cardiac medications remain unchanged and renewals  are up to date. Repeat labs will be ordered with PMD.\par \par Poncho will follow up with Vitaly Garcia for primary care. \par

## 2022-09-29 NOTE — PHYSICAL EXAM
[Well Developed] : well developed [Well Nourished] : well nourished [No Acute Distress] : no acute distress [Normal Conjunctiva] : normal conjunctiva [Normal Venous Pressure] : normal venous pressure [No Carotid Bruit] : no carotid bruit [Normal S1, S2] : normal S1, S2 [No Murmur] : no murmur [No Rub] : no rub [No Gallop] : no gallop [Clear Lung Fields] : clear lung fields [Good Air Entry] : good air entry [No Respiratory Distress] : no respiratory distress  [Soft] : abdomen soft [Non Tender] : non-tender [No Masses/organomegaly] : no masses/organomegaly [Normal Bowel Sounds] : normal bowel sounds [Normal Gait] : normal gait [No Edema] : no edema [No Cyanosis] : no cyanosis [No Clubbing] : no clubbing [No Varicosities] : no varicosities [No Rash] : no rash [No Skin Lesions] : no skin lesions [Moves all extremities] : moves all extremities [No Focal Deficits] : no focal deficits [Normal Speech] : normal speech [Alert and Oriented] : alert and oriented [Normal memory] : normal memory [General Appearance - Well Developed] : well developed [Normal Appearance] : normal appearance [Well Groomed] : well groomed [General Appearance - Well Nourished] : well nourished [No Deformities] : no deformities [General Appearance - In No Acute Distress] : no acute distress [Normal Oral Mucosa] : normal oral mucosa [No Oral Pallor] : no oral pallor [No Oral Cyanosis] : no oral cyanosis [Normal Jugular Venous A Waves Present] : normal jugular venous A waves present [Normal Jugular Venous V Waves Present] : normal jugular venous V waves present [No Jugular Venous Robles A Waves] : no jugular venous robles A waves [Respiration, Rhythm And Depth] : normal respiratory rhythm and effort [Exaggerated Use Of Accessory Muscles For Inspiration] : no accessory muscle use [Auscultation Breath Sounds / Voice Sounds] : lungs were clear to auscultation bilaterally [Heart Rate And Rhythm] : heart rate and rhythm were normal [Heart Sounds] : normal S1 and S2 [Murmurs] : no murmurs present [Abdomen Soft] : soft [Abdomen Tenderness] : non-tender [Abdomen Mass (___ Cm)] : no abdominal mass palpated [Abnormal Walk] : normal gait [Gait - Sufficient For Exercise Testing] : the gait was sufficient for exercise testing [Nail Clubbing] : no clubbing of the fingernails [Cyanosis, Localized] : no localized cyanosis [Petechial Hemorrhages (___cm)] : no petechial hemorrhages [] : no ischemic changes [Oriented To Time, Place, And Person] : oriented to person, place, and time [Affect] : the affect was normal [Mood] : the mood was normal [No Anxiety] : not feeling anxious [de-identified] : Femoral access site is c/d/i without hematoma.  2+ distal pulses [FreeTextEntry1] : Pleasant muscular middle-aged male

## 2022-12-01 ENCOUNTER — APPOINTMENT (OUTPATIENT)
Dept: CARDIOLOGY | Facility: CLINIC | Age: 57
End: 2022-12-01

## 2022-12-01 ENCOUNTER — NON-APPOINTMENT (OUTPATIENT)
Age: 57
End: 2022-12-01

## 2022-12-01 VITALS
SYSTOLIC BLOOD PRESSURE: 180 MMHG | HEART RATE: 84 BPM | WEIGHT: 251 LBS | OXYGEN SATURATION: 98 % | HEIGHT: 70 IN | DIASTOLIC BLOOD PRESSURE: 120 MMHG | TEMPERATURE: 97.3 F | BODY MASS INDEX: 35.93 KG/M2

## 2022-12-01 DIAGNOSIS — E78.49 OTHER HYPERLIPIDEMIA: ICD-10-CM

## 2022-12-01 PROCEDURE — 93000 ELECTROCARDIOGRAM COMPLETE: CPT

## 2022-12-01 PROCEDURE — 99215 OFFICE O/P EST HI 40 MIN: CPT | Mod: 25

## 2022-12-01 PROCEDURE — 93306 TTE W/DOPPLER COMPLETE: CPT

## 2022-12-01 NOTE — DISCUSSION/SUMMARY
[FreeTextEntry1] : Poncho is a 57-year-old male  with medical history detailed above and active medical issues including:\par \par - No further chest pain.  History of atypical chest pain, borderline abnormal baseline EKG unchanged,  normal perfusion MPI stress test, normal LVEF Nov 2017. Normal exercise stress echo normal LVEF of October 2019, high risk coronary calcium score 481 Sept 2021, nonobstructive catheterization Oct 2021, continue aggressive risk factor modification.\par \par -  Hypertension,  patient will check home BPs daily for one week to confirm average BPs at goal. Discussed low sodium diet reduce caffeine and alcohol intake. \par \par - Dyslipidemia on high-dose atorvastatin well-tolerated\par \par - Ascending aortic aneurysm 4.4 echo Nov 2022 prior 3.8cm.  Contrast CTA chest ordered with telehealth followup.  Follow-up echocardiogram in 6 months to reassess aortic size.  Patient again reminded to limit heavy weight lifting to less than 80 pounds. \par \par - Multiple orthopedic surgeries. \par \par -  [employer] and [employer], \par \par Patient will be seen in cardiology followup 6 months same-day echocardiogram.\par \par Current cardiac medications remain unchanged and renewals  are up to date. Repeat labs will be ordered with PMD.\par \par Poncho will follow up with Vitaly Garcia for primary care. \par

## 2022-12-01 NOTE — PHYSICAL EXAM
[Well Developed] : well developed [Well Nourished] : well nourished [No Acute Distress] : no acute distress [Normal Conjunctiva] : normal conjunctiva [Normal Venous Pressure] : normal venous pressure [No Carotid Bruit] : no carotid bruit [Normal S1, S2] : normal S1, S2 [No Murmur] : no murmur [No Rub] : no rub [No Gallop] : no gallop [Clear Lung Fields] : clear lung fields [Good Air Entry] : good air entry [No Respiratory Distress] : no respiratory distress  [Soft] : abdomen soft [Non Tender] : non-tender [No Masses/organomegaly] : no masses/organomegaly [Normal Bowel Sounds] : normal bowel sounds [Normal Gait] : normal gait [No Edema] : no edema [No Cyanosis] : no cyanosis [No Clubbing] : no clubbing [No Varicosities] : no varicosities [No Rash] : no rash [No Skin Lesions] : no skin lesions [Moves all extremities] : moves all extremities [No Focal Deficits] : no focal deficits [Normal Speech] : normal speech [Alert and Oriented] : alert and oriented [Normal memory] : normal memory [General Appearance - Well Developed] : well developed [Normal Appearance] : normal appearance [Well Groomed] : well groomed [General Appearance - Well Nourished] : well nourished [No Deformities] : no deformities [General Appearance - In No Acute Distress] : no acute distress [Normal Oral Mucosa] : normal oral mucosa [No Oral Pallor] : no oral pallor [No Oral Cyanosis] : no oral cyanosis [Normal Jugular Venous A Waves Present] : normal jugular venous A waves present [Normal Jugular Venous V Waves Present] : normal jugular venous V waves present [No Jugular Venous Robles A Waves] : no jugular venous robles A waves [Respiration, Rhythm And Depth] : normal respiratory rhythm and effort [Exaggerated Use Of Accessory Muscles For Inspiration] : no accessory muscle use [Auscultation Breath Sounds / Voice Sounds] : lungs were clear to auscultation bilaterally [Heart Rate And Rhythm] : heart rate and rhythm were normal [Heart Sounds] : normal S1 and S2 [Murmurs] : no murmurs present [Abdomen Soft] : soft [Abdomen Tenderness] : non-tender [Abdomen Mass (___ Cm)] : no abdominal mass palpated [Abnormal Walk] : normal gait [Gait - Sufficient For Exercise Testing] : the gait was sufficient for exercise testing [Nail Clubbing] : no clubbing of the fingernails [Cyanosis, Localized] : no localized cyanosis [Petechial Hemorrhages (___cm)] : no petechial hemorrhages [] : no ischemic changes [Oriented To Time, Place, And Person] : oriented to person, place, and time [Affect] : the affect was normal [Mood] : the mood was normal [No Anxiety] : not feeling anxious [de-identified] : Femoral access site is c/d/i without hematoma.  2+ distal pulses [FreeTextEntry1] : Pleasant muscular middle-aged male

## 2022-12-01 NOTE — HISTORY OF PRESENT ILLNESS
[FreeTextEntry1] : Poncho is a 57-year-old male [place]  with history of labile HTN,  dyslipidemia, multiple orthopedic surgeries, high risk coronary calcium score 481 Sept 2021, nonobstructive catheterization Oct 2021, ascending aortic aneurysm 4.4 echo Nov 2022 prior 3.8cm. \par \par Patient states average home blood pressures at guideline goal.  Patient will monitor home blood pressure daily over the next week. Discussed moderate exercise, weight loss, low salt diet, avoidance of alcohol and caffeine. \par \par Cardiovascular review of symptoms is negative for exertional chest pain, dyspnea, palpitations, dizziness or syncope.  No PND or orthopnea leg edema.  No bleeding or black stool.\par \par Patient is exercising 30 minutes in the gym on elliptical machine and walking without exertional chest pain. Patient had a 15 pound weight gain over the past 6 months. \par \par Cardiac catheterization Oct 2021 nonobstructive coronaries, mLAD 30%, mRCA 40% medical therapy\par \par Coronary calcium score Sept 2021, total calcium 481, , RI 99, LCx 42, \par \par Labs Oct 2020 normal CBC, BMP, LFT, fasting cholesterol 208, triglyceride 84, HDL 44, \par \par Exercise stress echo October 2019, LVEF 60%, normal exercise wall motion, equivocal EKG response, no chest pain, 104% MPHR, 11 minutes Taras protocol, baseline sinus rhythm LAD, hypertensive blood pressure response.\par \par Echocardiogram October 2019, LVEF 60%, mild concentric LVH, normal RVSP, ascending aorta 3.8cm. \par \par Carotid and abdominal ultrasound October 2019, mild nonobstructive plaque, normal abdominal aortic size limited study  \par \par Labs June 2018, normal CBC, BMP, LFTs, TSH, HbA1c 5.0,  potassium 5.4, total cholesterol 243, triglyceride 129, HDL 49, \par \par Exercise Myoview stress test October 2017 LVEF 58%, normal perfusion, nonischemic EKG response, no anginal symptoms, baseline NSR early repolarization,  90% MPHR, 10 minutes Atras protocol.\par \par 2-D echo October 2017, LVEF 60%, normal diastolic function, trace MR TR, normal PASP the\par \par Carotid and abdominal ultrasound October 2017, normal aortic size, nonobstructive\par

## 2023-02-03 ENCOUNTER — NON-APPOINTMENT (OUTPATIENT)
Age: 58
End: 2023-02-03

## 2023-03-27 ENCOUNTER — NON-APPOINTMENT (OUTPATIENT)
Age: 58
End: 2023-03-27

## 2023-03-27 ENCOUNTER — APPOINTMENT (OUTPATIENT)
Dept: CARDIOLOGY | Facility: CLINIC | Age: 58
End: 2023-03-27
Payer: COMMERCIAL

## 2023-03-27 VITALS
DIASTOLIC BLOOD PRESSURE: 92 MMHG | HEART RATE: 71 BPM | WEIGHT: 248 LBS | SYSTOLIC BLOOD PRESSURE: 144 MMHG | HEIGHT: 70 IN | BODY MASS INDEX: 35.5 KG/M2 | OXYGEN SATURATION: 96 %

## 2023-03-27 DIAGNOSIS — G44.219 EPISODIC TENSION-TYPE HEADACHE, NOT INTRACTABLE: ICD-10-CM

## 2023-03-27 PROCEDURE — 99215 OFFICE O/P EST HI 40 MIN: CPT

## 2023-03-27 NOTE — HISTORY OF PRESENT ILLNESS
[FreeTextEntry1] : Poncho is a 57-year-old male [place]  with history of labile HTN,  dyslipidemia, multiple orthopedic surgeries, high risk coronary calcium score 481 Sept 2021, nonobstructive catheterization Oct 2021, ascending aortic aneurysm 4.4 echo Nov 2022 prior 3.8cm. \par \par Patient states average home blood pressures at guideline goal.  Patient will monitor home blood pressure daily over the next week. Discussed moderate exercise, weight loss, low salt diet, avoidance of alcohol and caffeine. \par \par Cardiovascular review of symptoms is negative for exertional chest pain, dyspnea, palpitations, dizziness or syncope.  No PND or orthopnea leg edema.  No bleeding or black stool.\par \par Patient is exercising 30 minutes in the gym on elliptical machine and walking without exertional chest pain. Patient had a 15 pound weight gain over the past 6 months. \par \par Cardiac catheterization Oct 2021 nonobstructive coronaries, mLAD 30%, mRCA 40% medical therapy\par \par Coronary calcium score Sept 2021, total calcium 481, , RI 99, LCx 42, \par \par Labs Oct 2020 normal CBC, BMP, LFT, fasting cholesterol 208, triglyceride 84, HDL 44, \par \par Exercise stress echo October 2019, LVEF 60%, normal exercise wall motion, equivocal EKG response, no chest pain, 104% MPHR, 11 minutes Taras protocol, baseline sinus rhythm LAD, hypertensive blood pressure response.\par \par Echocardiogram October 2019, LVEF 60%, mild concentric LVH, normal RVSP, ascending aorta 3.8cm. \par \par Carotid and abdominal ultrasound October 2019, mild nonobstructive plaque, normal abdominal aortic size limited study  \par \par Labs June 2018, normal CBC, BMP, LFTs, TSH, HbA1c 5.0,  potassium 5.4, total cholesterol 243, triglyceride 129, HDL 49, \par \par Exercise Myoview stress test October 2017 LVEF 58%, normal perfusion, nonischemic EKG response, no anginal symptoms, baseline NSR early repolarization,  90% MPHR, 10 minutes Taras protocol.\par \par 2-D echo October 2017, LVEF 60%, normal diastolic function, trace MR TR, normal PASP the\par \par Carotid and abdominal ultrasound October 2017, normal aortic size, nonobstructive\par

## 2023-03-27 NOTE — PHYSICAL EXAM
[Well Developed] : well developed [Well Nourished] : well nourished [No Acute Distress] : no acute distress [Normal Conjunctiva] : normal conjunctiva [Normal Venous Pressure] : normal venous pressure [No Carotid Bruit] : no carotid bruit [Normal S1, S2] : normal S1, S2 [No Murmur] : no murmur [No Rub] : no rub [No Gallop] : no gallop [Clear Lung Fields] : clear lung fields [Good Air Entry] : good air entry [No Respiratory Distress] : no respiratory distress  [Soft] : abdomen soft [Non Tender] : non-tender [No Masses/organomegaly] : no masses/organomegaly [Normal Bowel Sounds] : normal bowel sounds [Normal Gait] : normal gait [No Edema] : no edema [No Cyanosis] : no cyanosis [No Clubbing] : no clubbing [No Varicosities] : no varicosities [No Rash] : no rash [No Skin Lesions] : no skin lesions [Moves all extremities] : moves all extremities [No Focal Deficits] : no focal deficits [Normal Speech] : normal speech [Alert and Oriented] : alert and oriented [Normal memory] : normal memory [General Appearance - Well Developed] : well developed [Normal Appearance] : normal appearance [Well Groomed] : well groomed [General Appearance - Well Nourished] : well nourished [No Deformities] : no deformities [General Appearance - In No Acute Distress] : no acute distress [Normal Oral Mucosa] : normal oral mucosa [No Oral Pallor] : no oral pallor [No Oral Cyanosis] : no oral cyanosis [Normal Jugular Venous A Waves Present] : normal jugular venous A waves present [Normal Jugular Venous V Waves Present] : normal jugular venous V waves present [No Jugular Venous Robles A Waves] : no jugular venous robles A waves [Respiration, Rhythm And Depth] : normal respiratory rhythm and effort [Exaggerated Use Of Accessory Muscles For Inspiration] : no accessory muscle use [Auscultation Breath Sounds / Voice Sounds] : lungs were clear to auscultation bilaterally [Heart Rate And Rhythm] : heart rate and rhythm were normal [Heart Sounds] : normal S1 and S2 [Murmurs] : no murmurs present [Abdomen Soft] : soft [Abdomen Tenderness] : non-tender [Abdomen Mass (___ Cm)] : no abdominal mass palpated [Abnormal Walk] : normal gait [Gait - Sufficient For Exercise Testing] : the gait was sufficient for exercise testing [Nail Clubbing] : no clubbing of the fingernails [Cyanosis, Localized] : no localized cyanosis [Petechial Hemorrhages (___cm)] : no petechial hemorrhages [] : no ischemic changes [Oriented To Time, Place, And Person] : oriented to person, place, and time [Affect] : the affect was normal [Mood] : the mood was normal [No Anxiety] : not feeling anxious [de-identified] : Femoral access site is c/d/i without hematoma.  2+ distal pulses [FreeTextEntry1] : Pleasant muscular middle-aged male

## 2023-03-27 NOTE — DISCUSSION/SUMMARY
[FreeTextEntry1] : Poncho is a 57-year-old male  with medical history detailed above and active medical issues including:\par \par - Cardiology preop epidural pain management Dr Marcelo 4/4/23.  Patient is optimized from a cardiovascular perspective and may proceed with surgery.  Patient currently not on anticoagulation.  Patient may hold aspirin 1 week prior to proceedure.  Continue losartan, amlodipine up to the time of procedure.  Please call with any further questions.\par \par - No further chest pain.  History of atypical chest pain, borderline abnormal baseline EKG unchanged,  normal perfusion MPI stress test, normal LVEF Nov 2017. Normal exercise stress echo normal LVEF of October 2019, high risk coronary calcium score 481 Sept 2021 started on aspirin, nonobstructive catheterization Oct 2021, continue aggressive risk factor modification.\par \par -  Hypertension on losartan and amlodipine,  patient will check home BPs daily for one week to confirm average BPs at goal. Discussed low sodium diet reduce caffeine and alcohol intake. \par \par - Dyslipidemia on atorvastatin well-tolerated\par \par - Ascending aortic aneurysm 4.4 echo Nov 2022 prior 3.8cm.  Contrast CTA chest Jan 2023 ascending aorta 4.2 cm.  Follow-up echocardiogram in 6 months to reassess aortic size.  Patient again reminded to limit heavy weight lifting to less than 80 pounds. \par \par - Multiple orthopedic surgeries. \par \par -  Prem MENARD and [employer], \par \par Patient will be seen in cardiology followup 6 months same-day echocardiogram.\par \par Current cardiac medications remain unchanged and renewals  are up to date. Repeat labs will be ordered with PMD.\par \par Poncho will follow up with Vitaly Garcia for primary care. \par

## 2023-04-04 RX ORDER — AMLODIPINE BESYLATE 5 MG/1
5 TABLET ORAL DAILY
Qty: 90 | Refills: 1 | Status: DISCONTINUED | COMMUNITY
Start: 2023-02-01 | End: 2023-04-04

## 2023-05-15 ENCOUNTER — NON-APPOINTMENT (OUTPATIENT)
Age: 58
End: 2023-05-15

## 2023-06-06 ENCOUNTER — NON-APPOINTMENT (OUTPATIENT)
Age: 58
End: 2023-06-06

## 2023-06-13 ENCOUNTER — APPOINTMENT (OUTPATIENT)
Dept: CARDIOLOGY | Facility: CLINIC | Age: 58
End: 2023-06-13

## 2023-06-14 ENCOUNTER — NON-APPOINTMENT (OUTPATIENT)
Age: 58
End: 2023-06-14

## 2023-06-14 ENCOUNTER — APPOINTMENT (OUTPATIENT)
Dept: CARDIOLOGY | Facility: CLINIC | Age: 58
End: 2023-06-14
Payer: COMMERCIAL

## 2023-06-14 VITALS
OXYGEN SATURATION: 77 % | WEIGHT: 240 LBS | SYSTOLIC BLOOD PRESSURE: 176 MMHG | DIASTOLIC BLOOD PRESSURE: 108 MMHG | HEART RATE: 96 BPM | HEIGHT: 70 IN | BODY MASS INDEX: 34.36 KG/M2

## 2023-06-14 VITALS
BODY MASS INDEX: 34.36 KG/M2 | HEIGHT: 70 IN | WEIGHT: 240 LBS | DIASTOLIC BLOOD PRESSURE: 108 MMHG | HEART RATE: 77 BPM | SYSTOLIC BLOOD PRESSURE: 176 MMHG | OXYGEN SATURATION: 96 %

## 2023-06-14 PROCEDURE — 99214 OFFICE O/P EST MOD 30 MIN: CPT | Mod: 25

## 2023-06-14 PROCEDURE — 93000 ELECTROCARDIOGRAM COMPLETE: CPT

## 2023-06-14 NOTE — DISCUSSION/SUMMARY
[FreeTextEntry1] : SHANNON HARRIS is a 57 year old M who presents today Jun 14, 2023 with the above history and the following active issues:\par \par SOB/lightheadedness/headaches with elevated BPs. Patient asymptomatic in office today.\par Increase Toprol Xl to 50mg daily.\par Add HCTZ 25mg daily.\par Continue Losartan 100mg daily.\par Fasting labs in 1 week.\par Obtain 2d echocardiogram to evaluate resting heart structure, valvular function, and LVEF.\par Obtain carotid ultrasound to evaluate for evidence of significant carotid atherosclerosis or obstruction.\par Obtain abdominal ultrasound to evaluate for aortic aneurysm or significant aortic atherosclerosis.\par Obtain 3 day Zio monitor to determine presence of arrhythmias or significant pauses. \par \par Nonobstructive catheterization Oct 2021: On asa and Atorvastatin.\par \par HLD: Continue statin as above.\par \par Ascending aortic aneurysm 4.4 echo Nov 2022 prior 3.8cm. CT 1/17/23 with the above measurements and upcoming echo scheduled.\par \par Patient has been advised to continue to trend BPs and will go to the ER for sx's and/or elevated BPs. RN team to call patient at 5:30 pm to reassess BP. Patient going straight to [facility] pharmacy in [place] to  meds.\par \par Consider ischemic eval once BP stable.\par \par Ongoing f/u with PCP.\par \par F/U after testing to review results (fasting labs, echo, carotid, abd, and Zio).\par Discussed red flag symptoms, which would warrant sooner or emergent medical evaluation.\par Any questions and concerns were addressed and resolved.\par \par Sincerely,\par Roxie Bhatt [facility]\par Patient's history, testing, and plan was reviewed with supervising physician, Dr. Patrick Valentino.\par \par

## 2023-06-14 NOTE — HISTORY OF PRESENT ILLNESS
[FreeTextEntry1] : SHANNON HARRIS is a 57 year old male with a past medical history of [place]  with history of labile HTN, dyslipidemia, multiple orthopedic surgeries, high risk coronary calcium score 481 Sept 2021, nonobstructive catheterization Oct 2021, ascending aortic aneurysm 4.4 echo Nov 2022 prior 3.8cm. \par \par Last seen 3/27/23 for preop epidural pain management with Dr. Marcelo 4/4/23. Epidural could not be done because BP was too high. Multiple phone calls in interim with elevated BPs and med changes made. Amlodipine was stopped. He was supposed to increase Toprol XL to 50mg daily, but he did not do so. Presents today for evaluation of elevated BPs. States with hgih BPs he feels SOB and lightheadedness. Sometimes gets a headaches. Denies any sx's presently. Denies CP, palpitations, syncope, claudication, and edema. Remote smoking hx as a teen. Lifts light weights, walks, does elliptical 2-3x a week without complaints.\par \par BP on my exam 180/120. Home write cuff reads 184/115. Omron cuff 187/130.\par \par Testing:\par \par EKG 6/14/23: SR at 78 bpm, LVH, NM interval 162 ms, QTc 403 ms, nonspecific ST-T wave abnormalities \par \par EKG 3/27/23: SR at 70 bpm, NM interval 168 ms, QTc 387 ms, nonspecific ST-T wave abnormalities \par \par CT Chest with IV contrast 1/17/23: The aortic root at level of simuses of Valsalva measures 4 cm. The aorta at the level of the sinotubular junction measures 3.1 cm. Ascending aorta at the level of the main pulm artery measures 4.2cm x 4.2 cm. The transverse aortic arch measures 2.9 cm. The descending thoracic aorta measures 2.8 cm at the level of the main pulm artery. THe aorta at the level of the hiatus measures 2.4 cm. Coronary artery calcifications are noted.\par \par Echo 12/1/22: CONCLUSIONS:\par 1. Left ventricular ejection fraction is visually estimated at 60%.\par 2. Moderately dilated left atrium.\par 3. No pericardial effusion seen.\par 4. The proximal ascending aorta is mildly dilated.\par 5. There is mild left ventricular hypertrophy.\par 6. Compared to a prior transthoracicreport from 11/9/2021, increase in LAE, dilated ascending\par aorta and mild LVH is seen.\par \par Labs 9/27/22: Na 141, K 4.7, Cr 0.87, Ca 9.3, AST 22, ALT 23, Trigs 115, HDL 47, LDL 80, Chol 150, \par \par Cardiac catheterization Oct 2021 nonobstructive coronaries, mLAD 30%, mRCA 40% medical therapy\par \par Coronary calcium score Sept 2021, total calcium 481, , RI 99, LCx 42, \par \par Carotid artery duplex report 3/23/21: Normal BL carotid artery duplex exam.\par \par Transcranial doppler report 3/23/21: Normal flow noted BL.\par \par Abd u/s 10/8/19: No AAA. TDS, may limit accuracy. Prox aorta measures upper limits of normal. Largest aortic diameter 2.9 cm.

## 2023-06-15 ENCOUNTER — NON-APPOINTMENT (OUTPATIENT)
Age: 58
End: 2023-06-15

## 2023-06-16 ENCOUNTER — NON-APPOINTMENT (OUTPATIENT)
Age: 58
End: 2023-06-16

## 2023-06-23 ENCOUNTER — APPOINTMENT (OUTPATIENT)
Dept: CARDIOLOGY | Facility: CLINIC | Age: 58
End: 2023-06-23
Payer: COMMERCIAL

## 2023-06-23 VITALS
SYSTOLIC BLOOD PRESSURE: 152 MMHG | HEIGHT: 70 IN | DIASTOLIC BLOOD PRESSURE: 100 MMHG | WEIGHT: 243 LBS | HEART RATE: 86 BPM | OXYGEN SATURATION: 94 % | BODY MASS INDEX: 34.79 KG/M2

## 2023-06-23 PROCEDURE — 99214 OFFICE O/P EST MOD 30 MIN: CPT

## 2023-06-23 NOTE — DISCUSSION/SUMMARY
[FreeTextEntry1] : SHANNON HARRIS is a 57 year old M who presents today Jun 23, 2023 with the above history and the following active issues:\par \par \par Chest pain. SOB. Lightheadedness.\par Echo, carotid, abd u/s and labs pending.\par Sleep study ordered by outside physician pending.\par Obtain exercise stress test to assess BP response to exercise, exercise induced arrhythmias, or evidence of ischemia. To be done off Metoprolol.\par Obtain renal artery u/s.\par \par BP improving. Patient states it was 120/80 at home last night. He did nor bring in his cuff. It is elevated on my exam as noted above. Continue Losartan 100mg daily and HCTZ 25mg daily. Add Amlodipine 5mg daily. Decrease Toprol to 25mg daily.\par \par Nonobstructive catheterization Oct 2021: On asa and Atorvastatin.\par \par HLD: Continue statin as above.\par \par Ascending aortic aneurysm 4.4 echo Nov 2022 prior 3.8cm. CT 1/17/23 with the above measurements and upcoming echo scheduled.\par \par Patient has been advised to continue to trend BPs and will go to the ER for sx's and/or elevated BPs.\par \par \par Ongoing f/u with PCP.\par \par F/U after testing to review results.\par Discussed red flag symptoms, which would warrant sooner or emergent medical evaluation.\par Any questions and concerns were addressed and resolved.\par \par Sincerely,\par Roxie Bhatt FNP-BC\par Patient's history, testing, and plan was reviewed with supervising physician, Dr. Antoine Farrell\par \par

## 2023-06-23 NOTE — ASSESSMENT
[FreeTextEntry1] : Testing:\par \par Zio 6/14/23-6/17/23: SR average HR 76 bpm. PAC burden <1%. PVC burden <1%.\par \par EKG 6/14/23: SR at 78 bpm, LVH, MO interval 162 ms, QTc 403 ms, nonspecific ST-T wave abnormalities \par \par EKG 3/27/23: SR at 70 bpm, MO interval 168 ms, QTc 387 ms, nonspecific ST-T wave abnormalities \par \par CT Chest with IV contrast 1/17/23: The aortic root at level of simuses of Valsalva measures 4 cm. The aorta at the level of the sinotubular junction measures 3.1 cm. Ascending aorta at the level of the main pulm artery measures 4.2cm x 4.2 cm. The transverse aortic arch measures 2.9 cm. The descending thoracic aorta measures 2.8 cm at the level of the main pulm artery. THe aorta at the level of the hiatus measures 2.4 cm. Coronary artery calcifications are noted.\par \par Echo 12/1/22: CONCLUSIONS:\par 1. Left ventricular ejection fraction is visually estimated at 60%.\par 2. Moderately dilated left atrium.\par 3. No pericardial effusion seen.\par 4. The proximal ascending aorta is mildly dilated.\par 5. There is mild left ventricular hypertrophy.\par 6. Compared to a prior transthoracicreport from 11/9/2021, increase in LAE, dilated ascending\par aorta and mild LVH is seen.\par \par Labs 9/27/22: Na 141, K 4.7, Cr 0.87, Ca 9.3, AST 22, ALT 23, Trigs 115, HDL 47, LDL 80, Chol 150, \par \par Cardiac catheterization Oct 2021 nonobstructive coronaries, mLAD 30%, mRCA 40% medical therapy\par \par Coronary calcium score Sept 2021, total calcium 481, , RI 99, LCx 42, \par \par Carotid artery duplex report 3/23/21: Normal BL carotid artery duplex exam.\par \par Transcranial doppler report 3/23/21: Normal flow noted BL.\par \par Abd u/s 10/8/19: No AAA. TDS, may limit accuracy. Prox aorta measures upper limits of normal. Largest aortic diameter 2.9 cm.

## 2023-06-23 NOTE — HISTORY OF PRESENT ILLNESS
[FreeTextEntry1] : SHANNON HARRIS is a 57 year old male with a past medical history of [place]  with history of labile HTN, dyslipidemia, multiple orthopedic surgeries, high risk coronary calcium score 481 Sept 2021, nonobstructive catheterization Oct 2021, ascending aortic aneurysm 4.4 echo Nov 2022 prior 3.8cm. \par \par \par \par Last seen 6/14/23. When last seen BP was elevated. Toprol was increased to 50mg daily. HCTZ 25mg daily was ordered. Lbas ordered. Echo, carotid, abd, and Zio ordered. See details below. Zio completed, but labs and ultrasound imaging not done yet. In the interim there have been no hospitalizations or procedures. Reports exertional CP and SOB, as well as lightheadedness. Denies palpitations, syncope, claudication, and edema. Remote smoking hx as a teen. Lifts light weights, walks, does elliptical 2-3x a week without complaints.\par \par BP on my exam 150/110 left arm and 150/100 right arm. O2 sat 95% on room air.\par \par \par

## 2023-06-27 ENCOUNTER — APPOINTMENT (OUTPATIENT)
Dept: CARDIOLOGY | Facility: CLINIC | Age: 58
End: 2023-06-27
Payer: COMMERCIAL

## 2023-06-27 PROCEDURE — 93880 EXTRACRANIAL BILAT STUDY: CPT

## 2023-06-27 PROCEDURE — 93306 TTE W/DOPPLER COMPLETE: CPT

## 2023-06-27 PROCEDURE — 93979 VASCULAR STUDY: CPT

## 2023-07-12 ENCOUNTER — APPOINTMENT (OUTPATIENT)
Dept: CARDIOLOGY | Facility: CLINIC | Age: 58
End: 2023-07-12
Payer: COMMERCIAL

## 2023-07-12 PROCEDURE — 93015 CV STRESS TEST SUPVJ I&R: CPT

## 2023-07-20 ENCOUNTER — APPOINTMENT (OUTPATIENT)
Dept: CARDIOLOGY | Facility: CLINIC | Age: 58
End: 2023-07-20
Payer: COMMERCIAL

## 2023-07-20 ENCOUNTER — NON-APPOINTMENT (OUTPATIENT)
Age: 58
End: 2023-07-20

## 2023-07-20 VITALS
HEIGHT: 70 IN | BODY MASS INDEX: 35.79 KG/M2 | SYSTOLIC BLOOD PRESSURE: 138 MMHG | OXYGEN SATURATION: 98 % | DIASTOLIC BLOOD PRESSURE: 86 MMHG | WEIGHT: 250 LBS | HEART RATE: 80 BPM | RESPIRATION RATE: 14 BRPM

## 2023-07-20 PROCEDURE — 93000 ELECTROCARDIOGRAM COMPLETE: CPT

## 2023-07-20 PROCEDURE — 99214 OFFICE O/P EST MOD 30 MIN: CPT | Mod: 25

## 2023-07-20 NOTE — DISCUSSION/SUMMARY
[FreeTextEntry1] : SHANNON HARRIS is a 57 year old M who presents today Jun 23, 2023 with the above history and the following active issues:\par \par \par Chest pain. SOB. Lightheadedness.\par Echo, carotid, abd u/s and labs pending.\par Sleep study ordered by outside physician pending.\par Obtain exercise stress test to assess BP response to exercise, exercise induced arrhythmias, or evidence of ischemia. To be done off Metoprolol.\par Obtain renal artery u/s.\par \par BP improving. Patient states it was 120/80 at home last night. He did nor bring in his cuff. It is elevated on my exam as noted above. Continue Losartan 100mg daily and HCTZ 25mg daily. Add Amlodipine 5mg daily. Decrease Toprol to 25mg daily.\par \par Nonobstructive catheterization Oct 2021: On asa and Atorvastatin.\par \par HLD: Continue statin as above.\par \par Ascending aortic aneurysm 4.4 echo Nov 2022 prior 3.8cm. CT 1/17/23 with the above measurements and upcoming echo scheduled.\par \par Patient has been advised to continue to trend BPs and will go to the ER for sx's and/or elevated BPs.\par \par \par Ongoing f/u with PCP.\par \par \par Patient's history, testing, and plan was reviewed with supervising physician, Dr. Antoine Farrell\par \par  [EKG obtained to assist in diagnosis and management of assessed problem(s)] : EKG obtained to assist in diagnosis and management of assessed problem(s)

## 2023-07-20 NOTE — HISTORY OF PRESENT ILLNESS
[FreeTextEntry1] : SHANNON HARRIS is a 57 year old male with a past medical history of [place]  with history of labile HTN, dyslipidemia, multiple orthopedic surgeries, high risk coronary calcium score 481 Sept 2021, nonobstructive catheterization Oct 2021, ascending aortic aneurysm 4.4 echo Nov 2022 prior 3.8cm. \par \par \par \par  BP was elevated. Toprol was increased to 50mg daily. HCTZ 25mg daily was ordered. Lbas ordered. Echo, carotid, abd, and Zio ordered. See details below. Zio completed, but labs and ultrasound imaging not done yet. In the interim there have been no hospitalizations or procedures. Reports exertional CP and SOB, as well as lightheadedness. Denies palpitations, syncope, claudication, and edema. Remote smoking hx as a teen. Lifts light weights, walks, does elliptical 2-3x a week without complaints.\par \par BP on my exam 150/110 left arm and 150/100 right arm. O2 sat 95% on room air.\par \par The patient is presenting here today to review results of recent cardiac testing.  Patient is asymptomatic, physically very active.  Blood pressure is much better controlled since last visit.  He denies any chest pains or shortness of breath.\par \par \par

## 2023-07-20 NOTE — ASSESSMENT
[FreeTextEntry1] : Testing:\par \par Zio 6/14/23-6/17/23: SR average HR 76 bpm. PAC burden <1%. PVC burden <1%.\par \par EKG 6/14/23: SR at 78 bpm, LVH, WI interval 162 ms, QTc 403 ms, nonspecific ST-T wave abnormalities \par \par EKG 3/27/23: SR at 70 bpm, WI interval 168 ms, QTc 387 ms, nonspecific ST-T wave abnormalities \par \par CT Chest with IV contrast 1/17/23: The aortic root at level of simuses of Valsalva measures 4 cm. The aorta at the level of the sinotubular junction measures 3.1 cm. Ascending aorta at the level of the main pulm artery measures 4.2cm x 4.2 cm. The transverse aortic arch measures 2.9 cm. The descending thoracic aorta measures 2.8 cm at the level of the main pulm artery. THe aorta at the level of the hiatus measures 2.4 cm. Coronary artery calcifications are noted.\par \par Echo 12/1/22: CONCLUSIONS:\par 1. Left ventricular ejection fraction is visually estimated at 60%.\par 2. Moderately dilated left atrium.\par 3. No pericardial effusion seen.\par 4. The proximal ascending aorta is mildly dilated.\par 5. There is mild left ventricular hypertrophy.\par 6. Compared to a prior transthoracicreport from 11/9/2021, increase in LAE, dilated ascending\par aorta and mild LVH is seen.\par \par Labs 9/27/22: Na 141, K 4.7, Cr 0.87, Ca 9.3, AST 22, ALT 23, Trigs 115, HDL 47, LDL 80, Chol 150, \par \par Cardiac catheterization Oct 2021 nonobstructive coronaries, mLAD 30%, mRCA 40% medical therapy\par \par Coronary calcium score Sept 2021, total calcium 481, , RI 99, LCx 42, \par \par Carotid artery duplex report 3/23/21: Normal BL carotid artery duplex exam.\par \par Transcranial doppler report 3/23/21: Normal flow noted BL.\par \par Abd u/s 10/8/19: No AAA. TDS, may limit accuracy. Prox aorta measures upper limits of normal. Largest aortic diameter 2.9 cm.\par \par Results of stress test, echocardiogram, carotid ultrasound and abdominal ultrasound reviewed and discussed with the patient.  Stable ascending aortic aneurysm measuring 4.4 cm.  Mild abdominal aortic aneurysm.  No carotid disease.  Good exercise tolerance and no evidence of ischemia on stress test.  Blood pressure is well controlled on present medications.  Low-sodium diet, exercise and weight loss discussed with the patient.  Continue blood pressure monitoring.  Blood work results reviewed.  Repeat echocardiogram and abdominal ultrasound in 6 months.  Follow-up 6 months and as needed.

## 2023-12-04 ENCOUNTER — APPOINTMENT (OUTPATIENT)
Dept: CARDIOLOGY | Facility: CLINIC | Age: 58
End: 2023-12-04

## 2023-12-26 ENCOUNTER — RX RENEWAL (OUTPATIENT)
Age: 58
End: 2023-12-26

## 2024-01-19 ENCOUNTER — APPOINTMENT (OUTPATIENT)
Dept: CARDIOLOGY | Facility: CLINIC | Age: 59
End: 2024-01-19
Payer: COMMERCIAL

## 2024-01-19 PROCEDURE — 93306 TTE W/DOPPLER COMPLETE: CPT

## 2024-01-30 PROBLEM — I77.810 ASCENDING AORTA DILATATION: Status: ACTIVE | Noted: 2022-12-01

## 2024-01-30 PROBLEM — I20.89 ANGINAL EQUIVALENT: Status: ACTIVE | Noted: 2021-09-27

## 2024-01-30 PROBLEM — R94.31 ABNORMAL ELECTROCARDIOGRAM [ECG] [EKG]: Status: ACTIVE | Noted: 2017-10-13

## 2024-02-06 ENCOUNTER — APPOINTMENT (OUTPATIENT)
Dept: CARDIOLOGY | Facility: CLINIC | Age: 59
End: 2024-02-06
Payer: COMMERCIAL

## 2024-02-06 VITALS
HEIGHT: 70 IN | DIASTOLIC BLOOD PRESSURE: 98 MMHG | SYSTOLIC BLOOD PRESSURE: 152 MMHG | HEART RATE: 95 BPM | WEIGHT: 240 LBS | OXYGEN SATURATION: 97 % | BODY MASS INDEX: 34.36 KG/M2

## 2024-02-06 DIAGNOSIS — I20.89 OTHER FORMS OF ANGINA PECTORIS: ICD-10-CM

## 2024-02-06 DIAGNOSIS — I77.810 THORACIC AORTIC ECTASIA: ICD-10-CM

## 2024-02-06 DIAGNOSIS — R93.1 ABNORMAL FINDINGS ON DIAGNOSTIC IMAGING OF HEART AND CORONARY CIRCULATION: ICD-10-CM

## 2024-02-06 DIAGNOSIS — E78.5 HYPERLIPIDEMIA, UNSPECIFIED: ICD-10-CM

## 2024-02-06 DIAGNOSIS — I10 ESSENTIAL (PRIMARY) HYPERTENSION: ICD-10-CM

## 2024-02-06 DIAGNOSIS — R09.81 NASAL CONGESTION: ICD-10-CM

## 2024-02-06 DIAGNOSIS — R94.31 ABNORMAL ELECTROCARDIOGRAM [ECG] [EKG]: ICD-10-CM

## 2024-02-06 DIAGNOSIS — R06.09 OTHER FORMS OF DYSPNEA: ICD-10-CM

## 2024-02-06 DIAGNOSIS — R42 DIZZINESS AND GIDDINESS: ICD-10-CM

## 2024-02-06 PROCEDURE — 99214 OFFICE O/P EST MOD 30 MIN: CPT

## 2024-02-06 RX ORDER — LOSARTAN POTASSIUM 100 MG/1
100 TABLET, FILM COATED ORAL DAILY
Qty: 90 | Refills: 1 | Status: ACTIVE | COMMUNITY
Start: 2019-09-18 | End: 1900-01-01

## 2024-02-06 RX ORDER — METOPROLOL SUCCINATE 25 MG/1
25 TABLET, EXTENDED RELEASE ORAL DAILY
Qty: 90 | Refills: 3 | Status: ACTIVE | COMMUNITY
Start: 2023-04-04 | End: 1900-01-01

## 2024-02-06 RX ORDER — AMLODIPINE BESYLATE 5 MG/1
5 TABLET ORAL
Qty: 90 | Refills: 1 | Status: ACTIVE | COMMUNITY
Start: 2023-06-23 | End: 1900-01-01

## 2024-02-06 RX ORDER — ATORVASTATIN CALCIUM 40 MG/1
40 TABLET, FILM COATED ORAL
Qty: 90 | Refills: 1 | Status: ACTIVE | COMMUNITY
Start: 2021-09-27 | End: 1900-01-01

## 2024-02-06 RX ORDER — HYDROCHLOROTHIAZIDE 25 MG/1
25 TABLET ORAL DAILY
Qty: 90 | Refills: 1 | Status: ACTIVE | COMMUNITY
Start: 2023-06-14 | End: 1900-01-01

## 2024-02-06 NOTE — HISTORY OF PRESENT ILLNESS
[FreeTextEntry1] : SHANNON HARRIS is a 58 year old male with a past medical history of [place]  with history of labile HTN, dyslipidemia, multiple orthopedic surgeries, high risk coronary calcium score 481 Sept 2021, nonobstructive catheterization Oct 2021, ascending aortic aneurysm 4.4 echo Nov 2022 prior 3.8cm.      BP was elevated. Toprol was increased to 50mg daily. HCTZ 25mg daily was ordered. Lbas ordered. Echo, carotid, abd, and Zio ordered. See details below. Zio completed, but labs and ultrasound imaging not done yet. In the interim there have been no hospitalizations or procedures. Reports exertional CP and SOB, as well as lightheadedness. Denies palpitations, syncope, claudication, and edema. Remote smoking hx as a teen. Lifts light weights, walks, does elliptical 2-3x a week without complaints.  BP on my exam 150/110 left arm and 150/100 right arm. O2 sat 95% on room air.  The patient is presenting here today to review results of recent cardiac testing.  Patient is asymptomatic, physically very active.  Blood pressure is much better controlled since last visit.  He denies any chest pains or shortness of breath.

## 2024-02-06 NOTE — DISCUSSION/SUMMARY
[FreeTextEntry1] : Poncho is a 57-year-old male with medical history detailed above and active medical issues including:  - Cardiology preop epidural pain management Dr Marcelo 4/4/23. Patient is optimized from a cardiovascular perspective and may proceed with surgery. Patient currently not on anticoagulation. Patient may hold aspirin 1 week prior to proceedure. Continue losartan, amlodipine up to the time of procedure. Please call with any further questions.  - No further chest pain. History of atypical chest pain, borderline abnormal baseline EKG unchanged, normal perfusion MPI stress test, normal LVEF Nov 2017. Normal exercise stress echo normal LVEF of October 2019, high risk coronary calcium score 481 Sept 2021 started on aspirin, nonobstructive catheterization Oct 2021, continue aggressive risk factor modification.  - Hypertension on losartan and amlodipine, patient will check home BPs daily for one week to confirm average BPs at goal. Discussed low sodium diet reduce caffeine and alcohol intake.  - Dyslipidemia on atorvastatin well-tolerated  - Ascending aortic aneurysm 4.4 echo Nov 2022 prior 3.8cm. Contrast CTA chest Jan 2023 ascending aorta 4.2 cm. Follow-up echocardiogram in 6 months to reassess aortic size. Patient again reminded to limit heavy weight lifting to less than 80 pounds.  - Multiple orthopedic surgeries.  -  Prem MENARD and [employer],   Patient will be seen in cardiology followup 6 months same-day echocardiogram.  Current cardiac medications remain unchanged and renewals are up to date. Repeat labs will be ordered with PMD.  Poncho will follow up with Vitaly Garcai for primary care.   Total time spent 45 minutes, reviewing of test results, chart information, patient discussion, physical exam and completion of chart documentation.

## 2024-02-06 NOTE — HISTORY OF PRESENT ILLNESS
[FreeTextEntry1] : SHANNON HARRIS is a 58 year old male with a past medical history of [place]  with history of labile HTN, dyslipidemia, multiple orthopedic surgeries, high risk coronary calcium score 481 Sept 2021, nonobstructive catheterization Oct 2021, ascending aortic aneurysm 4.4 echo Nov 2022 prior 3.8cm.      BP was elevated. Toprol was increased to 50mg daily. HCTZ 25mg daily was ordered. Lbas ordered. Echo, carotid, abd, and Zio ordered. See details below. Zio completed, but labs and ultrasound imaging not done yet. In the interim there have been no hospitalizations or procedures. Reports exertional CP and SOB, as well as lightheadedness. Denies palpitations, syncope, claudication, and edema. Remote smoking hx as a teen. Lifts light weights, walks, does elliptical 2-3x a week without complaints.  BP on my exam 150/110 left arm and 150/100 right arm. O2 sat 95% on room air.  The patient is presenting here today to review results of recent cardiac testing.  Patient is asymptomatic, physically very active.  Blood pressure is much better controlled since last visit.  He denies any chest pains or shortness of breath.

## 2024-02-06 NOTE — PHYSICAL EXAM
[Well Developed] : well developed [Well Nourished] : well nourished [No Acute Distress] : no acute distress [Normal Conjunctiva] : normal conjunctiva [Normal Venous Pressure] : normal venous pressure [No Carotid Bruit] : no carotid bruit [Normal S1, S2] : normal S1, S2 [No Murmur] : no murmur [No Rub] : no rub [No Gallop] : no gallop [Clear Lung Fields] : clear lung fields [Good Air Entry] : good air entry [No Respiratory Distress] : no respiratory distress  [Soft] : abdomen soft [Non Tender] : non-tender [No Masses/organomegaly] : no masses/organomegaly [Normal Bowel Sounds] : normal bowel sounds [Normal Gait] : normal gait [No Edema] : no edema [No Cyanosis] : no cyanosis [No Clubbing] : no clubbing [No Varicosities] : no varicosities [No Rash] : no rash [No Skin Lesions] : no skin lesions [Moves all extremities] : moves all extremities [No Focal Deficits] : no focal deficits [Normal Speech] : normal speech [Alert and Oriented] : alert and oriented [Normal memory] : normal memory [de-identified] : Femoral access site is c/d/i without hematoma.  2+ distal pulses [General Appearance - Well Developed] : well developed [Normal Appearance] : normal appearance [Well Groomed] : well groomed [General Appearance - Well Nourished] : well nourished [No Deformities] : no deformities [General Appearance - In No Acute Distress] : no acute distress [FreeTextEntry1] : Pleasant muscular middle-aged male [Normal Oral Mucosa] : normal oral mucosa [No Oral Pallor] : no oral pallor [No Oral Cyanosis] : no oral cyanosis [Normal Jugular Venous A Waves Present] : normal jugular venous A waves present [Normal Jugular Venous V Waves Present] : normal jugular venous V waves present [No Jugular Venous Robles A Waves] : no jugular venous robles A waves [Respiration, Rhythm And Depth] : normal respiratory rhythm and effort [Exaggerated Use Of Accessory Muscles For Inspiration] : no accessory muscle use [Auscultation Breath Sounds / Voice Sounds] : lungs were clear to auscultation bilaterally [Heart Rate And Rhythm] : heart rate and rhythm were normal [Heart Sounds] : normal S1 and S2 [Murmurs] : no murmurs present [Abdomen Soft] : soft [Abdomen Tenderness] : non-tender [Abdomen Mass (___ Cm)] : no abdominal mass palpated [Abnormal Walk] : normal gait [Gait - Sufficient For Exercise Testing] : the gait was sufficient for exercise testing [Nail Clubbing] : no clubbing of the fingernails [Cyanosis, Localized] : no localized cyanosis [Petechial Hemorrhages (___cm)] : no petechial hemorrhages [] : no ischemic changes [Oriented To Time, Place, And Person] : oriented to person, place, and time [Affect] : the affect was normal [Mood] : the mood was normal [No Anxiety] : not feeling anxious

## 2024-02-06 NOTE — DISCUSSION/SUMMARY
[FreeTextEntry1] : Poncho is a 57-year-old male with medical history detailed above and active medical issues including:  - Cardiology preop epidural pain management Dr Marcelo 4/4/23. Patient is optimized from a cardiovascular perspective and may proceed with surgery. Patient currently not on anticoagulation. Patient may hold aspirin 1 week prior to proceedure. Continue losartan, amlodipine up to the time of procedure. Please call with any further questions.  - No further chest pain. History of atypical chest pain, borderline abnormal baseline EKG unchanged, normal perfusion MPI stress test, normal LVEF Nov 2017. Normal exercise stress echo normal LVEF of October 2019, high risk coronary calcium score 481 Sept 2021 started on aspirin, nonobstructive catheterization Oct 2021, continue aggressive risk factor modification.  - Hypertension on losartan and amlodipine, patient will check home BPs daily for one week to confirm average BPs at goal. Discussed low sodium diet reduce caffeine and alcohol intake.  - Dyslipidemia on atorvastatin well-tolerated  - Ascending aortic aneurysm 4.4 echo Nov 2022 prior 3.8cm. Contrast CTA chest Jan 2023 ascending aorta 4.2 cm. Follow-up echocardiogram in 6 months to reassess aortic size. Patient again reminded to limit heavy weight lifting to less than 80 pounds.  - Multiple orthopedic surgeries.  -  [employer] and [employer],   Patient will be seen in cardiology followup 6 months same-day echocardiogram.  Current cardiac medications remain unchanged and renewals are up to date. Repeat labs will be ordered with PMD.  Poncho will follow up with Vitaly Garcia for primary care.   Total time spent 45 minutes, reviewing of test results, chart information, patient discussion, physical exam and completion of chart documentation.

## 2024-02-06 NOTE — PHYSICAL EXAM
[Well Developed] : well developed [Well Nourished] : well nourished [No Acute Distress] : no acute distress [Normal Conjunctiva] : normal conjunctiva [Normal Venous Pressure] : normal venous pressure [No Carotid Bruit] : no carotid bruit [Normal S1, S2] : normal S1, S2 [No Murmur] : no murmur [No Rub] : no rub [No Gallop] : no gallop [Clear Lung Fields] : clear lung fields [Good Air Entry] : good air entry [No Respiratory Distress] : no respiratory distress  [Soft] : abdomen soft [Non Tender] : non-tender [No Masses/organomegaly] : no masses/organomegaly [Normal Bowel Sounds] : normal bowel sounds [Normal Gait] : normal gait [No Edema] : no edema [No Cyanosis] : no cyanosis [No Clubbing] : no clubbing [No Varicosities] : no varicosities [No Rash] : no rash [No Skin Lesions] : no skin lesions [Moves all extremities] : moves all extremities [No Focal Deficits] : no focal deficits [Normal Speech] : normal speech [Alert and Oriented] : alert and oriented [Normal memory] : normal memory [de-identified] : Femoral access site is c/d/i without hematoma.  2+ distal pulses [General Appearance - Well Developed] : well developed [Normal Appearance] : normal appearance [Well Groomed] : well groomed [General Appearance - Well Nourished] : well nourished [No Deformities] : no deformities [General Appearance - In No Acute Distress] : no acute distress [FreeTextEntry1] : Pleasant muscular middle-aged male [Normal Oral Mucosa] : normal oral mucosa [No Oral Pallor] : no oral pallor [No Oral Cyanosis] : no oral cyanosis [Normal Jugular Venous A Waves Present] : normal jugular venous A waves present [Normal Jugular Venous V Waves Present] : normal jugular venous V waves present [No Jugular Venous Robles A Waves] : no jugular venous robles A waves [Respiration, Rhythm And Depth] : normal respiratory rhythm and effort [Exaggerated Use Of Accessory Muscles For Inspiration] : no accessory muscle use [Auscultation Breath Sounds / Voice Sounds] : lungs were clear to auscultation bilaterally [Heart Rate And Rhythm] : heart rate and rhythm were normal [Heart Sounds] : normal S1 and S2 [Murmurs] : no murmurs present [Abdomen Soft] : soft [Abdomen Tenderness] : non-tender [Abdomen Mass (___ Cm)] : no abdominal mass palpated [Abnormal Walk] : normal gait [Gait - Sufficient For Exercise Testing] : the gait was sufficient for exercise testing [Nail Clubbing] : no clubbing of the fingernails [Cyanosis, Localized] : no localized cyanosis [Petechial Hemorrhages (___cm)] : no petechial hemorrhages [] : no ischemic changes [Oriented To Time, Place, And Person] : oriented to person, place, and time [Affect] : the affect was normal [Mood] : the mood was normal [No Anxiety] : not feeling anxious

## 2024-09-18 ENCOUNTER — APPOINTMENT (OUTPATIENT)
Dept: CARDIOLOGY | Facility: CLINIC | Age: 59
End: 2024-09-18
Payer: COMMERCIAL

## 2024-09-18 ENCOUNTER — NON-APPOINTMENT (OUTPATIENT)
Age: 59
End: 2024-09-18

## 2024-09-18 VITALS
OXYGEN SATURATION: 96 % | WEIGHT: 250 LBS | DIASTOLIC BLOOD PRESSURE: 100 MMHG | BODY MASS INDEX: 35.79 KG/M2 | SYSTOLIC BLOOD PRESSURE: 150 MMHG | HEIGHT: 70 IN | HEART RATE: 82 BPM

## 2024-09-18 DIAGNOSIS — E78.5 HYPERLIPIDEMIA, UNSPECIFIED: ICD-10-CM

## 2024-09-18 DIAGNOSIS — R94.31 ABNORMAL ELECTROCARDIOGRAM [ECG] [EKG]: ICD-10-CM

## 2024-09-18 DIAGNOSIS — I20.89 OTHER FORMS OF ANGINA PECTORIS: ICD-10-CM

## 2024-09-18 DIAGNOSIS — I10 ESSENTIAL (PRIMARY) HYPERTENSION: ICD-10-CM

## 2024-09-18 DIAGNOSIS — R93.1 ABNORMAL FINDINGS ON DIAGNOSTIC IMAGING OF HEART AND CORONARY CIRCULATION: ICD-10-CM

## 2024-09-18 DIAGNOSIS — I77.810 THORACIC AORTIC ECTASIA: ICD-10-CM

## 2024-09-18 DIAGNOSIS — R06.09 OTHER FORMS OF DYSPNEA: ICD-10-CM

## 2024-09-18 PROCEDURE — G2211 COMPLEX E/M VISIT ADD ON: CPT | Mod: NC

## 2024-09-18 PROCEDURE — 99214 OFFICE O/P EST MOD 30 MIN: CPT

## 2024-09-18 NOTE — HISTORY OF PRESENT ILLNESS
[FreeTextEntry1] : Poncho is a 58-year-old male [place]  with history of labile HTN,  dyslipidemia, multiple orthopedic surgeries, high risk coronary calcium score 481 Sept 2021, nonobstructive catheterization Oct 2021, ascending aortic aneurysm 4.2 CTA Jan 2023   Patient states average home blood pressures at guideline goal.  Patient will monitor home blood pressure daily over the next week. Discussed moderate exercise, weight loss, low salt diet, avoidance of alcohol and caffeine.   Cardiovascular review of symptoms is negative for exertional chest pain, dyspnea, palpitations, dizziness or syncope.  No PND or orthopnea leg edema.  No bleeding or black stool.  Patient is exercising 30 minutes in the gym on elliptical machine and walking without exertional chest pain.  Echocardiogram Jan 2024 LVEF 57%, mild MR, normal ascending aortic size, echo measurement may be inaccurate as CTA measurement 4.2 cm Jan 2023  Exercise stress echo July 2023 normal LVEF with normal exercise wall motion, nonischemic EKG response, no chest pain, 9 minutes Taras protocol 91% MPHR  Cardiac catheterization Oct 2021 nonobstructive coronaries, mLAD 30%, mRCA 40% medical therapy  Coronary calcium score Sept 2021, total calcium 481, , RI 99, LCx 42,   Labs Oct 2020 normal CBC, BMP, LFT, fasting cholesterol 208, triglyceride 84, HDL 44,   Exercise stress echo October 2019, LVEF 60%, normal exercise wall motion, equivocal EKG response, no chest pain, 104% MPHR, 11 minutes Taras protocol, baseline sinus rhythm LAD, hypertensive blood pressure response.  Echocardiogram October 2019, LVEF 60%, mild concentric LVH, normal RVSP, ascending aorta 3.8cm.   Carotid and abdominal ultrasound October 2019, mild nonobstructive plaque, normal abdominal aortic size limited study    Labs June 2018, normal CBC, BMP, LFTs, TSH, HbA1c 5.0,  potassium 5.4, total cholesterol 243, triglyceride 129, HDL 49,   Exercise Myoview stress test October 2017 LVEF 58%, normal perfusion, nonischemic EKG response, no anginal symptoms, baseline NSR early repolarization,  90% MPHR, 10 minutes Taras protocol.  2-D echo October 2017, LVEF 60%, normal diastolic function, trace MR TR, normal PASP the  Carotid and abdominal ultrasound October 2017, normal aortic size, nonobstructive

## 2024-09-18 NOTE — PHYSICAL EXAM
[Well Developed] : well developed [Well Nourished] : well nourished [No Acute Distress] : no acute distress [Normal Conjunctiva] : normal conjunctiva [Normal Venous Pressure] : normal venous pressure [No Carotid Bruit] : no carotid bruit [Normal S1, S2] : normal S1, S2 [No Murmur] : no murmur [No Rub] : no rub [No Gallop] : no gallop [Clear Lung Fields] : clear lung fields [Good Air Entry] : good air entry [No Respiratory Distress] : no respiratory distress  [Soft] : abdomen soft [Non Tender] : non-tender [No Masses/organomegaly] : no masses/organomegaly [Normal Bowel Sounds] : normal bowel sounds [Normal Gait] : normal gait [No Edema] : no edema [No Cyanosis] : no cyanosis [No Clubbing] : no clubbing [No Varicosities] : no varicosities [No Rash] : no rash [No Skin Lesions] : no skin lesions [Moves all extremities] : moves all extremities [No Focal Deficits] : no focal deficits [Normal Speech] : normal speech [Alert and Oriented] : alert and oriented [Normal memory] : normal memory [General Appearance - Well Developed] : well developed [Normal Appearance] : normal appearance [Well Groomed] : well groomed [General Appearance - Well Nourished] : well nourished [No Deformities] : no deformities [General Appearance - In No Acute Distress] : no acute distress [Normal Oral Mucosa] : normal oral mucosa [No Oral Pallor] : no oral pallor [No Oral Cyanosis] : no oral cyanosis [Normal Jugular Venous A Waves Present] : normal jugular venous A waves present [Normal Jugular Venous V Waves Present] : normal jugular venous V waves present [No Jugular Venous Robles A Waves] : no jugular venous robles A waves [Respiration, Rhythm And Depth] : normal respiratory rhythm and effort [Exaggerated Use Of Accessory Muscles For Inspiration] : no accessory muscle use [Auscultation Breath Sounds / Voice Sounds] : lungs were clear to auscultation bilaterally [Heart Rate And Rhythm] : heart rate and rhythm were normal [Heart Sounds] : normal S1 and S2 [Murmurs] : no murmurs present [Abdomen Soft] : soft [Abdomen Tenderness] : non-tender [Abdomen Mass (___ Cm)] : no abdominal mass palpated [Abnormal Walk] : normal gait [Gait - Sufficient For Exercise Testing] : the gait was sufficient for exercise testing [Nail Clubbing] : no clubbing of the fingernails [Cyanosis, Localized] : no localized cyanosis [Petechial Hemorrhages (___cm)] : no petechial hemorrhages [] : no ischemic changes [Oriented To Time, Place, And Person] : oriented to person, place, and time [Affect] : the affect was normal [Mood] : the mood was normal [No Anxiety] : not feeling anxious [de-identified] : Femoral access site is c/d/i without hematoma.  2+ distal pulses [FreeTextEntry1] : Pleasant muscular middle-aged male

## 2024-09-18 NOTE — PHYSICAL EXAM
[Well Developed] : well developed [Well Nourished] : well nourished [No Acute Distress] : no acute distress [Normal Conjunctiva] : normal conjunctiva [Normal Venous Pressure] : normal venous pressure [No Carotid Bruit] : no carotid bruit [Normal S1, S2] : normal S1, S2 [No Murmur] : no murmur [No Rub] : no rub [No Gallop] : no gallop [Clear Lung Fields] : clear lung fields [Good Air Entry] : good air entry [No Respiratory Distress] : no respiratory distress  [Soft] : abdomen soft [Non Tender] : non-tender [No Masses/organomegaly] : no masses/organomegaly [Normal Bowel Sounds] : normal bowel sounds [Normal Gait] : normal gait [No Edema] : no edema [No Cyanosis] : no cyanosis [No Clubbing] : no clubbing [No Varicosities] : no varicosities [No Rash] : no rash [No Skin Lesions] : no skin lesions [Moves all extremities] : moves all extremities [No Focal Deficits] : no focal deficits [Normal Speech] : normal speech [Alert and Oriented] : alert and oriented [Normal memory] : normal memory [General Appearance - Well Developed] : well developed [Normal Appearance] : normal appearance [Well Groomed] : well groomed [General Appearance - Well Nourished] : well nourished [No Deformities] : no deformities [General Appearance - In No Acute Distress] : no acute distress [Normal Oral Mucosa] : normal oral mucosa [No Oral Pallor] : no oral pallor [No Oral Cyanosis] : no oral cyanosis [Normal Jugular Venous A Waves Present] : normal jugular venous A waves present [Normal Jugular Venous V Waves Present] : normal jugular venous V waves present [No Jugular Venous Robles A Waves] : no jugular venous robles A waves [Respiration, Rhythm And Depth] : normal respiratory rhythm and effort [Exaggerated Use Of Accessory Muscles For Inspiration] : no accessory muscle use [Auscultation Breath Sounds / Voice Sounds] : lungs were clear to auscultation bilaterally [Heart Rate And Rhythm] : heart rate and rhythm were normal [Heart Sounds] : normal S1 and S2 [Murmurs] : no murmurs present [Abdomen Soft] : soft [Abdomen Tenderness] : non-tender [Abdomen Mass (___ Cm)] : no abdominal mass palpated [Abnormal Walk] : normal gait [Gait - Sufficient For Exercise Testing] : the gait was sufficient for exercise testing [Nail Clubbing] : no clubbing of the fingernails [Cyanosis, Localized] : no localized cyanosis [Petechial Hemorrhages (___cm)] : no petechial hemorrhages [] : no ischemic changes [Oriented To Time, Place, And Person] : oriented to person, place, and time [Affect] : the affect was normal [Mood] : the mood was normal [No Anxiety] : not feeling anxious [de-identified] : Femoral access site is c/d/i without hematoma.  2+ distal pulses [FreeTextEntry1] : Pleasant muscular middle-aged male

## 2024-09-18 NOTE — DISCUSSION/SUMMARY
[FreeTextEntry1] : Poncho has a past medical history detailed above and active medical issues including:  - Chronic back pain, epidural pain management Dr Marcelo 4/4/23.   - Atypical chest pain resolved, normal exercise stress echo July 2023, high risk coronary calcium score 481, nonobstructive catheterization Oct 2021, continue aggressive risk factor modification.  -  Hypertension, average resting home BPs at guideline goal on Toprol, losartan HCTZ, amlodipine.  Continue to monitor average resting home BPs to confirm adequate BP control. Discussed low sodium diet reduce caffeine and alcohol intake.   - Obesity.  Discussed intermittent fasting, calorie reduction and increased exercise as a weight reduction strategy.  - Dyslipidemia on atorvastatin well-tolerated  - Ascending aortic aneurysm 4.4 echo Nov 2022 prior 3.8cm.  Contrast CTA chest Jan 2023 ascending aorta 4.2 cm.  Follow-up echocardiogram Jan 2024 normal aortic size.  Patient again reminded to limit heavy weight lifting to less than 80 pounds.   - Multiple orthopedic surgeries.   -  [employer] and [employer],   Patient will be seen in cardiology followup 6 months with echocardiogram  Current cardiac medications remain unchanged and renewals  are up to date. Repeat labs will be ordered with PMD.  Poncho will follow up with Vitaly Garcia for primary care.  Total time spent 45 minutes, reviewing of test results, chart information, patient discussion, physical exam and completion of chart documentation.

## 2024-09-18 NOTE — HISTORY OF PRESENT ILLNESS
[FreeTextEntry1] : Poncho is a 58-year-old male [place]  with history of labile HTN,  dyslipidemia, multiple orthopedic surgeries, high risk coronary calcium score 481 Sept 2021, nonobstructive catheterization Oct 2021, ascending aortic aneurysm 4.2 CTA Jan 2023   Patient states average home blood pressures at guideline goal.  Patient will monitor home blood pressure daily over the next week. Discussed moderate exercise, weight loss, low salt diet, avoidance of alcohol and caffeine.   Cardiovascular review of symptoms is negative for exertional chest pain, dyspnea, palpitations, dizziness or syncope.  No PND or orthopnea leg edema.  No bleeding or black stool.  Patient is exercising 30 minutes in the gym on elliptical machine and walking without exertional chest pain.  Echocardiogram Jan 2024 LVEF 57%, mild MR, normal ascending aortic size, echo measurement may be inaccurate as CTA measurement 4.2 cm Jan 2023  Exercise stress echo July 2023 normal LVEF with normal exercise wall motion, nonischemic EKG response, no chest pain, 9 minutes Taras protocol 91% MPHR  Cardiac catheterization Oct 2021 nonobstructive coronaries, mLAD 30%, mRCA 40% medical therapy  Coronary calcium score Sept 2021, total calcium 481, , RI 99, LCx 42,   Labs Oct 2020 normal CBC, BMP, LFT, fasting cholesterol 208, triglyceride 84, HDL 44,   Exercise stress echo October 2019, LVEF 60%, normal exercise wall motion, equivocal EKG response, no chest pain, 104% MPHR, 11 minutes Taras protocol, baseline sinus rhythm LAD, hypertensive blood pressure response.  Echocardiogram October 2019, LVEF 60%, mild concentric LVH, normal RVSP, ascending aorta 3.8cm.   Carotid and abdominal ultrasound October 2019, mild nonobstructive plaque, normal abdominal aortic size limited study    Labs June 2018, normal CBC, BMP, LFTs, TSH, HbA1c 5.0,  potassium 5.4, total cholesterol 243, triglyceride 129, HDL 49,   Exercise Myoview stress test October 2017 LVEF 58%, normal perfusion, nonischemic EKG response, no anginal symptoms, baseline NSR early repolarization,  90% MPHR, 10 minutes Taras protocol.  2-D echo October 2017, LVEF 60%, normal diastolic function, trace MR TR, normal PASP the  Carotid and abdominal ultrasound October 2017, normal aortic size, nonobstructive

## 2025-03-31 ENCOUNTER — NON-APPOINTMENT (OUTPATIENT)
Age: 60
End: 2025-03-31

## 2025-04-01 ENCOUNTER — APPOINTMENT (OUTPATIENT)
Dept: CARDIOLOGY | Facility: CLINIC | Age: 60
End: 2025-04-01
Payer: COMMERCIAL

## 2025-04-01 VITALS
DIASTOLIC BLOOD PRESSURE: 106 MMHG | BODY MASS INDEX: 35.87 KG/M2 | OXYGEN SATURATION: 96 % | WEIGHT: 250 LBS | HEART RATE: 90 BPM | SYSTOLIC BLOOD PRESSURE: 140 MMHG

## 2025-04-01 DIAGNOSIS — R06.09 OTHER FORMS OF DYSPNEA: ICD-10-CM

## 2025-04-01 DIAGNOSIS — I77.810 THORACIC AORTIC ECTASIA: ICD-10-CM

## 2025-04-01 DIAGNOSIS — I10 ESSENTIAL (PRIMARY) HYPERTENSION: ICD-10-CM

## 2025-04-01 DIAGNOSIS — R94.31 ABNORMAL ELECTROCARDIOGRAM [ECG] [EKG]: ICD-10-CM

## 2025-04-01 DIAGNOSIS — R42 DIZZINESS AND GIDDINESS: ICD-10-CM

## 2025-04-01 DIAGNOSIS — I20.89 OTHER FORMS OF ANGINA PECTORIS: ICD-10-CM

## 2025-04-01 DIAGNOSIS — R93.1 ABNORMAL FINDINGS ON DIAGNOSTIC IMAGING OF HEART AND CORONARY CIRCULATION: ICD-10-CM

## 2025-04-01 DIAGNOSIS — E78.5 HYPERLIPIDEMIA, UNSPECIFIED: ICD-10-CM

## 2025-04-01 PROCEDURE — 93306 TTE W/DOPPLER COMPLETE: CPT

## 2025-04-01 PROCEDURE — 99215 OFFICE O/P EST HI 40 MIN: CPT

## 2025-08-25 ENCOUNTER — RX RENEWAL (OUTPATIENT)
Age: 60
End: 2025-08-25